# Patient Record
Sex: FEMALE | Race: WHITE
[De-identification: names, ages, dates, MRNs, and addresses within clinical notes are randomized per-mention and may not be internally consistent; named-entity substitution may affect disease eponyms.]

---
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
## 2023-09-14 ENCOUNTER — OFFICE VISIT (OUTPATIENT)
Dept: INTERNAL MEDICINE | Facility: CLINIC | Age: 18
End: 2023-09-14
Payer: COMMERCIAL

## 2023-09-14 ENCOUNTER — PATIENT MESSAGE (OUTPATIENT)
Dept: INTERNAL MEDICINE | Facility: CLINIC | Age: 18
End: 2023-09-14

## 2023-09-14 VITALS
HEIGHT: 61 IN | SYSTOLIC BLOOD PRESSURE: 110 MMHG | DIASTOLIC BLOOD PRESSURE: 84 MMHG | HEART RATE: 88 BPM | WEIGHT: 223 LBS | BODY MASS INDEX: 42.1 KG/M2 | RESPIRATION RATE: 12 BRPM | TEMPERATURE: 97.8 F

## 2023-09-14 DIAGNOSIS — E55.9 VITAMIN D DEFICIENCY: ICD-10-CM

## 2023-09-14 DIAGNOSIS — E66.01 MORBID (SEVERE) OBESITY DUE TO EXCESS CALORIES: ICD-10-CM

## 2023-09-14 DIAGNOSIS — R00.2 HEART PALPITATIONS: ICD-10-CM

## 2023-09-14 DIAGNOSIS — R07.89 OTHER CHEST PAIN: Primary | ICD-10-CM

## 2023-09-14 DIAGNOSIS — F33.1 MODERATE EPISODE OF RECURRENT MAJOR DEPRESSIVE DISORDER: ICD-10-CM

## 2023-09-14 DIAGNOSIS — F41.1 GENERALIZED ANXIETY DISORDER: ICD-10-CM

## 2023-09-14 DIAGNOSIS — J98.59 MASS OF MEDIASTINUM: ICD-10-CM

## 2023-09-14 DIAGNOSIS — F17.290 OTHER TOBACCO PRODUCT NICOTINE DEPENDENCE, UNCOMPLICATED: ICD-10-CM

## 2023-09-14 LAB
25(OH)D3 SERPL-MCNC: 25.1 NG/ML (ref 30–100)
ALBUMIN SERPL-MCNC: 4.3 G/DL (ref 3.5–5.2)
ALBUMIN/GLOB SERPL: 1.4 G/DL
ALP SERPL-CCNC: 110 U/L (ref 39–117)
ALT SERPL W P-5'-P-CCNC: 33 U/L (ref 1–33)
ANION GAP SERPL CALCULATED.3IONS-SCNC: 10 MMOL/L (ref 5–15)
AST SERPL-CCNC: 24 U/L (ref 1–32)
BASOPHILS # BLD AUTO: 0.05 10*3/MM3 (ref 0–0.2)
BASOPHILS NFR BLD AUTO: 0.7 % (ref 0–1.5)
BILIRUB SERPL-MCNC: 0.2 MG/DL (ref 0–1.2)
BUN SERPL-MCNC: 13 MG/DL (ref 6–20)
BUN/CREAT SERPL: 18.1 (ref 7–25)
CALCIUM SPEC-SCNC: 9.8 MG/DL (ref 8.6–10.5)
CHLORIDE SERPL-SCNC: 105 MMOL/L (ref 98–107)
CHOLEST SERPL-MCNC: 222 MG/DL (ref 0–200)
CO2 SERPL-SCNC: 25 MMOL/L (ref 22–29)
CREAT SERPL-MCNC: 0.72 MG/DL (ref 0.57–1)
DEPRECATED RDW RBC AUTO: 41 FL (ref 37–54)
EGFRCR SERPLBLD CKD-EPI 2021: 122.9 ML/MIN/1.73
EOSINOPHIL # BLD AUTO: 0.86 10*3/MM3 (ref 0–0.4)
EOSINOPHIL NFR BLD AUTO: 11.4 % (ref 0.3–6.2)
ERYTHROCYTE [DISTWIDTH] IN BLOOD BY AUTOMATED COUNT: 14.1 % (ref 12.3–15.4)
GLOBULIN UR ELPH-MCNC: 3.1 GM/DL
GLUCOSE SERPL-MCNC: 85 MG/DL (ref 65–99)
HCT VFR BLD AUTO: 37.8 % (ref 34–46.6)
HDLC SERPL-MCNC: 47 MG/DL (ref 40–60)
HGB BLD-MCNC: 12.5 G/DL (ref 12–15.9)
IMM GRANULOCYTES # BLD AUTO: 0.03 10*3/MM3 (ref 0–0.05)
IMM GRANULOCYTES NFR BLD AUTO: 0.4 % (ref 0–0.5)
LDLC SERPL CALC-MCNC: 161 MG/DL (ref 0–100)
LDLC/HDLC SERPL: 3.39 {RATIO}
LYMPHOCYTES # BLD AUTO: 1.92 10*3/MM3 (ref 0.7–3.1)
LYMPHOCYTES NFR BLD AUTO: 25.5 % (ref 19.6–45.3)
MCH RBC QN AUTO: 26.6 PG (ref 26.6–33)
MCHC RBC AUTO-ENTMCNC: 33.1 G/DL (ref 31.5–35.7)
MCV RBC AUTO: 80.4 FL (ref 79–97)
MONOCYTES # BLD AUTO: 0.36 10*3/MM3 (ref 0.1–0.9)
MONOCYTES NFR BLD AUTO: 4.8 % (ref 5–12)
NEUTROPHILS NFR BLD AUTO: 4.31 10*3/MM3 (ref 1.7–7)
NEUTROPHILS NFR BLD AUTO: 57.2 % (ref 42.7–76)
NRBC BLD AUTO-RTO: 0 /100 WBC (ref 0–0.2)
PLATELET # BLD AUTO: 285 10*3/MM3 (ref 140–450)
PMV BLD AUTO: 10.2 FL (ref 6–12)
POTASSIUM SERPL-SCNC: 4.4 MMOL/L (ref 3.5–5.2)
PROT SERPL-MCNC: 7.4 G/DL (ref 6–8.5)
RBC # BLD AUTO: 4.7 10*6/MM3 (ref 3.77–5.28)
SODIUM SERPL-SCNC: 140 MMOL/L (ref 136–145)
T4 FREE SERPL-MCNC: 1.05 NG/DL (ref 0.93–1.7)
TRIGL SERPL-MCNC: 79 MG/DL (ref 0–150)
TSH SERPL DL<=0.05 MIU/L-ACNC: 1.25 UIU/ML (ref 0.27–4.2)
VLDLC SERPL-MCNC: 14 MG/DL (ref 5–40)
WBC NRBC COR # BLD: 7.53 10*3/MM3 (ref 3.4–10.8)

## 2023-09-14 PROCEDURE — 80050 GENERAL HEALTH PANEL: CPT | Performed by: PHYSICIAN ASSISTANT

## 2023-09-14 PROCEDURE — 80061 LIPID PANEL: CPT | Performed by: PHYSICIAN ASSISTANT

## 2023-09-14 PROCEDURE — 84439 ASSAY OF FREE THYROXINE: CPT | Performed by: PHYSICIAN ASSISTANT

## 2023-09-14 PROCEDURE — 82306 VITAMIN D 25 HYDROXY: CPT | Performed by: PHYSICIAN ASSISTANT

## 2023-09-14 NOTE — PROGRESS NOTES
"    Office Note     Name: Jose Francisco Mohan    : 2005     MRN: [id number]     Chief Complaint  New Patient , Mass (Left lung ), Chest Pain, Rapid Heart Rate, Obesity, Nicotine Dependence, Depression, and Anxiety    Subjective     History of Present Illness:  Jose Francisco Mohan is a 18 y.o. female who presents today to [facility] care. She has agreed to utilize [facility].     The patient had an CTAperformed in 2022 at  [facility], where a mass was present. She was never informed of mass noted on CT just noticed the report last weeks and would like further evaluation. Patient does report about 6 months ago Chest xray was performed and On chest x-ray, no mass was seen.The patient has not had a follow up appointment. The patient reports left sided, upper, chest pain. She states the pain is random. She describes the pain as stabbing, and states it lasts for no more than 10 seconds per time, up to 3 times per day. She denies the pain worsening upon exertion. She is unsure of the pain is food related. She has been experiencing pain for 1 year. She denies having cardiac testing performed.     The patient states she had recent labs performed. She inquires these results.     The patient inquires tachycardia, which she experienced while in the ER.     The patient inquires smoking cessation.    The patient reports anxiety and depression. She has previously taken Wellbutrin, which caused headache. She has previously taken Zoloft, which she states \"made her feel like a zombie\".    The patient inquires weight loss medication.     Review of Systems:   Review of Systems   Constitutional:  Positive for unexpected weight gain.   Cardiovascular:  Positive for chest pain.   Psychiatric/Behavioral:  Positive for depressed mood. The patient is nervous/anxious.      Past Medical History:   Past Medical History:   Diagnosis Date    Anxiety     Depression     Obesity        Past Surgical History:   Past Surgical History:   Procedure Laterality " "Date    EAR TUBES      LABIAL ADHESION LYSIS         Immunizations:   Immunization History   Administered Date(s) Administered    HPV Bivalent 08/21/2014    HPV Quadrivalent 08/21/2014    Meningococcal Conjugate 08/21/2014    Meningococcal Polysaccharide 08/21/2014    Tdap 08/21/2014        Medications:     Current Outpatient Medications:     vitamin D (ERGOCALCIFEROL) 1.25 MG (07183 UT) capsule capsule, Take 1 capsule by mouth 1 (One) Time Per Week., Disp: 8 capsule, Rfl: 0    Allergies:   Allergies   Allergen Reactions    Wellbutrin [Bupropion] Headache    Bactrim [Sulfamethoxazole-Trimethoprim] Rash       Family History:   Family History   Problem Relation Age of Onset    Anxiety disorder Mother     COPD Paternal Grandfather        Social History:   Social History     Socioeconomic History    Marital status: Single   Tobacco Use    Smoking status: Every Day     Types: Electronic Cigarette    Smokeless tobacco: Never   Vaping Use    Vaping Use: Every day    Substances: Nicotine    Devices: Disposable   Substance and Sexual Activity    Alcohol use: No    Drug use: No    Sexual activity: Yes     Partners: Male     Birth control/protection: I.U.D.         Objective     Vital Signs  /84 (BP Location: Left arm, Patient Position: Sitting, Cuff Size: Adult)   Pulse 88   Temp 97.8 °F (36.6 °C) (Infrared)   Resp 12   Ht 154.9 cm (61\")   Wt 101 kg (223 lb)   BMI 42.14 kg/m²   Estimated body mass index is 42.14 kg/m² as calculated from the following:    Height as of this encounter: 154.9 cm (61\").    Weight as of this encounter: 101 kg (223 lb).          Physical Exam  Vitals and nursing note reviewed.   Constitutional:       Appearance: Normal appearance. She is obese.   Cardiovascular:      Rate and Rhythm: Normal rate and regular rhythm.      Pulses: Normal pulses.      Heart sounds: Normal heart sounds.   Pulmonary:      Effort: Pulmonary effort is normal.      Breath sounds: Normal breath sounds. "   Musculoskeletal:         General: Normal range of motion.   Skin:     General: Skin is warm and dry.   Neurological:      General: No focal deficit present.      Mental Status: She is alert.   Psychiatric:         Mood and Affect: Mood normal.         Behavior: Behavior normal.        ECG 12 Lead    Date/Time: 9/19/2023 1:57 PM  Performed by: Monica Baig PA-C  Authorized by: Monica Baig PA-C   Previous ECG: no previous ECG available  Rhythm: sinus rhythm  Conduction: conduction normal  ST Segments: ST segments normal  Other: no other findings        Assessment and Plan     Problem List Items Addressed This Visit          Cardiac and Vasculature    Heart palpitations    Relevant Orders    ECG 12 Lead    Holter Monitor - 48 Hour    Treadmill Stress Test    GeneSight - Swab, (Completed)    Lipid Panel (Completed)    TSH (Completed)    T4, Free (Completed)    Vitamin D,25-Hydroxy (Completed)    Comprehensive Metabolic Panel (Completed)    CBC & Differential (Completed)    Other chest pain - Primary    Overview     The patient had an CTA performed in 11/2022 at  [facility], where a mass was present. She was never informed of mass noted on CT just noticed the report last weeks and would like further evaluation. Patient does report about 6 months ago Chest xray was performed and On chest x-ray, no mass was seen.The patient has not had a follow up appointment. The patient reports left sided, upper, chest pain. She states the pain is random. She describes the pain as stabbing, and states it lasts for no more than 10 seconds per time, up to 3 times per day. She denies the pain worsening upon exertion. She is unsure of the pain is food related. She has been experiencing pain for 1 year. She denies having cardiac testing performed.     Holter ECHO, stress test as well as CT chest ordered    Appointment with cardiology 9/28/23         Relevant Orders    ECG 12 Lead    Holter Monitor - 48 Hour    Treadmill Stress  Test    GeneSight - Swab, (Completed)    Lipid Panel (Completed)    TSH (Completed)    T4, Free (Completed)    Vitamin D,25-Hydroxy (Completed)    Comprehensive Metabolic Panel (Completed)    CBC & Differential (Completed)    CT Chest Without Contrast       Endocrine and Metabolic    Morbid (severe) obesity due to excess calories    Relevant Orders    GeneSight - Swab, (Completed)    Lipid Panel (Completed)    TSH (Completed)    T4, Free (Completed)    Vitamin D,25-Hydroxy (Completed)    Comprehensive Metabolic Panel (Completed)    CBC & Differential (Completed)    Vitamin D deficiency    Relevant Medications    vitamin D (ERGOCALCIFEROL) 1.25 MG (20383 UT) capsule capsule       Hematology and Neoplasia    Mass of mediastinum    Overview     The patient had an CTA performed in 11/2022 at  [facility], where a mass was present. She was never informed of mass noted on CT just noticed the report last weeks and would like further evaluation. Patient does report about 6 months ago Chest xray was performed and On chest x-ray, no mass was seen.The patient has not had a follow up appointment. The patient reports left sided, upper, chest pain. She states the pain is random. She describes the pain as stabbing, and states it lasts for no more than 10 seconds per time, up to 3 times per day. She denies the pain worsening upon exertion. She is unsure of the pain is food related. She has been experiencing pain for 1 year. She denies having cardiac testing performed.     CT with and without contrast ordered         Relevant Orders    CT Chest Without Contrast       Mental Health    Generalized anxiety disorder    Relevant Orders    GeneSight - Swab, (Completed)    Lipid Panel (Completed)    TSH (Completed)    T4, Free (Completed)    Vitamin D,25-Hydroxy (Completed)    Comprehensive Metabolic Panel (Completed)    CBC & Differential (Completed)    Moderate episode of recurrent major depressive disorder    Relevant Orders    GeneSight -  Swab, (Completed)    Lipid Panel (Completed)    TSH (Completed)    T4, Free (Completed)    Vitamin D,25-Hydroxy (Completed)    Comprehensive Metabolic Panel (Completed)    CBC & Differential (Completed)       Tobacco    Nicotine dependence          - I will place an order for CT, Holter monitor, and stress test. Patient counseled on smoking cessation. I will place an order for GeneSight. I will place an order for labs. I will prescribe Topamax. Patient advised to keep a food log. Patient will have EKG performed.     Smoking cessation quit date in approximately 3 months she is hoping that getting better control of anxiety and depression will help her stop.    Transcribed from ambient dictation for Monica Baig PA-C by Rhonda Andrade.  09/14/23   12:12 EDT    Patient or patient representative verbalized consent to the visit recording.  I have personally performed the services described in this document as transcribed by the above individual, and it is both accurate and complete.      Patient verbalized good understanding of diagnosis and treatment plan.  All questions answered to their satisfaction.      Red flag symptoms and indications to report to ER discussed with patient who verbalized good understanding.     Patient counseled on the side effects of prescribed medication and verbalized good understanding.  Recommended taking probiotics while taking antibiotics.  Patient is to call office for any new or worsening symptoms.  Patient verbalized understanding of indications to report to the ER.      Follow Up  Return in about 3 weeks (around 10/5/2023) for Recheck chest pain, anxiety, depression, labs, smoking cessation.    GENESIS PorrasE [facility]  [address] [address] [phone]

## 2023-09-14 NOTE — PATIENT INSTRUCTIONS
MyPlate from USDA    MyPlate is an outline of a general healthy diet based on the Dietary Guidelines for Americans, 8724-5512, from the U.S. Department of Agriculture (USDA). It sets guidelines for how much food you should eat from each food group based on your age, sex, and level of physical activity.  What are tips for following MyPlate?  To follow MyPlate recommendations:  Eat a wide variety of fruits and vegetables, grains, and protein foods.  Serve smaller portions and eat less food throughout the day.  Limit portion sizes to avoid overeating.  Enjoy your food.  Get at least 150 minutes of exercise every week. This is about 30 minutes each day, 5 or more days per week.  It can be difficult to have every meal look like MyPlate. Think about MyPlate as eating guidelines for an entire day, rather than each individual meal.  Fruits and vegetables  Make one half of your plate fruits and vegetables.  Eat many different colors of fruits and vegetables each day.  For a 2,000-calorie daily food plan, eat:  2½ cups of vegetables every day.  2 cups of fruit every day.  1 cup is equal to:  1 cup raw or cooked vegetables.  1 cup raw fruit.  1 medium-sized orange, apple, or banana.  1 cup 100% fruit or vegetable juice.  2 cups raw leafy greens, such as lettuce, spinach, or kale.  ½ cup dried fruit.  Grains  One fourth of your plate should be grains.  Make at least half of the grains you eat each day whole grains.  For a 2,000-calorie daily food plan, eat 6 oz of grains every day.  1 oz is equal to:  1 slice bread.  1 cup cereal.  ½ cup cooked rice, cereal, or pasta.  Protein  One fourth of your plate should be protein.  Eat a wide variety of protein foods, including meat, poultry, fish, eggs, beans, nuts, and tofu.  For a 2,000-calorie daily food plan, eat 5½ oz of protein every day.  1 oz is equal to:  1 oz meat, poultry, or fish.  ¼ cup cooked beans.  1 egg.  ½ oz nuts or seeds.  1 Tbsp peanut butter.  Dairy  Drink fat-free  or low-fat (1%) milk.  Eat or drink dairy as a side to meals.  For a 2,000-calorie daily food plan, eat or drink 3 cups of dairy every day.  1 cup is equal to:  1 cup milk, yogurt, cottage cheese, or soy milk (soy beverage).  2 oz processed cheese.  1½ oz natural cheese.  Fats, oils, salt, and sugars  Only small amounts of oils are recommended.  Avoid foods that are high in calories and low in nutritional value (empty calories), like foods high in fat or added sugars.  Choose foods that are low in salt (sodium). Choose foods that have less than 140 milligrams (mg) of sodium per serving.  Drink water instead of sugary drinks. Drink enough fluid to keep your urine pale yellow.  Where to find support  Work with your health care provider or a dietitian to develop a customized eating plan that is right for you.  Download an carlos (mobile application) to help you track your daily food intake.  Where to find more information  USDA: ChooseMyPlate.gov  Summary  MyPlate is a general guideline for healthy eating from the USDA. It is based on the Dietary Guidelines for Americans, 0601-8068.  In general, fruits and vegetables should take up one half of your plate, grains should take up one fourth of your plate, and protein should take up one fourth of your plate.  This information is not intended to replace advice given to you by your health care provider. Make sure you discuss any questions you have with your health care provider.  Document Revised: 11/08/2021 Document Reviewed: 11/08/2021  [employer] Patient Education © 2023 Elsevier Inc.

## 2023-09-15 RX ORDER — ERGOCALCIFEROL 1.25 MG/1
50000 CAPSULE ORAL WEEKLY
Qty: 8 CAPSULE | Refills: 0 | Status: SHIPPED | OUTPATIENT
Start: 2023-09-15

## 2023-09-15 NOTE — TELEPHONE ENCOUNTER
From: Jose Francisco Mohan  To: Monica Baig  Sent: 9/14/2023 9:07 PM EDT  Subject: Question regarding LIPID PANEL    Hey just curious what my results mean! Thanks

## 2023-09-19 PROBLEM — J98.59 MASS OF MEDIASTINUM: Status: ACTIVE | Noted: 2023-09-19

## 2023-09-19 PROBLEM — E55.9 VITAMIN D DEFICIENCY: Status: ACTIVE | Noted: 2023-09-19

## 2023-09-19 PROBLEM — F17.200 NICOTINE DEPENDENCE: Status: ACTIVE | Noted: 2023-09-19

## 2023-09-20 ENCOUNTER — TELEPHONE (OUTPATIENT)
Dept: INTERNAL MEDICINE | Facility: CLINIC | Age: 18
End: 2023-09-20
Payer: COMMERCIAL

## 2023-09-20 NOTE — TELEPHONE ENCOUNTER
----- Message from Monica Baig PA-C sent at 9/20/2023  7:56 AM EDT -----  Please inform patient gene sight testing has returned and encourage to make follow up to discuss

## 2023-09-20 NOTE — TELEPHONE ENCOUNTER
Caller: Jose Francisco Mohan    Relationship: Self    Best call back number:      What is the best time to reach you: ANYTIME    Who are you requesting to speak with (clinical staff, provider,  specific staff member): CLINICAL STAFF    Do you know the name of the person who called: JOSE FRANCISCO    What was the call regarding: PATIENT WAS READ ENCOUNTER, WILL KEEP APPT [id number]

## 2023-09-20 NOTE — TELEPHONE ENCOUNTER
I have called and left a message with return phone number    HUB OK TO READ:  Please inform patient gene sight testing has returned and encourage to make follow up to discuss

## 2023-09-26 ENCOUNTER — HOSPITAL ENCOUNTER (OUTPATIENT)
Dept: CARDIOLOGY | Facility: HOSPITAL | Age: 18
Discharge: HOME | End: 2023-09-26
Payer: COMMERCIAL

## 2023-09-26 ENCOUNTER — OFFICE VISIT (OUTPATIENT)
Dept: CARDIOLOGY | Facility: HOSPITAL | Age: 18
End: 2023-09-26
Payer: COMMERCIAL

## 2023-09-26 VITALS
TEMPERATURE: 97.7 F | HEIGHT: 61 IN | SYSTOLIC BLOOD PRESSURE: 97 MMHG | DIASTOLIC BLOOD PRESSURE: 57 MMHG | OXYGEN SATURATION: 97 % | WEIGHT: 219.6 LBS | HEART RATE: 102 BPM | BODY MASS INDEX: 41.46 KG/M2 | RESPIRATION RATE: 18 BRPM

## 2023-09-26 DIAGNOSIS — R00.2 HEART PALPITATIONS: ICD-10-CM

## 2023-09-26 DIAGNOSIS — R07.89 OTHER CHEST PAIN: ICD-10-CM

## 2023-09-26 DIAGNOSIS — E78.00 ELEVATED LDL CHOLESTEROL LEVEL: Primary | ICD-10-CM

## 2023-09-26 PROCEDURE — 93242 EXT ECG>48HR<7D RECORDING: CPT

## 2023-09-26 NOTE — PROGRESS NOTES
[facility] Heart Monitor Documentation    Jose Francisco Mohan  2005  [id number]  09/26/23      [] ZIO XT Patch  Model [id number] Prescribed for  Days    Serial Number: (N + 9 Digits) N   Apply-By Date on Box:   USPS Tracking Number:   USPS Tracking        [] [employer] MINI PLUS Mobile Cardiac Telemetry  Model BGMINIPLUS Prescribed for  Days    Serial Number: (BGM + 7 Digits) BGM  Shipped-By Date on Box:   UPS Tracking Number: [id number]  UPS Tracking      [x] [employer] MINI Holter Monitor  Model BGMINIEL Prescribed for 7 Days    Serial Number: (7 Digits) [id number]  Shipped-By Date on Box: 09/20/2023  UPS Tracking Number: [id number]  UPS Tracking        This monitor was applied to the patient's chest and checked for proper functioning.  Ms. Jose Francisco Mohan was instructed in the proper use of this monitor.  She was given the opportunity to ask questions and left the office with the device 's instruction manual.    Sis Malcolm, [facility], 14:40 EDT, 09/26/23                  [facility]

## 2023-09-26 NOTE — PROGRESS NOTES
"Chief Complaint  Palpitations and Chest Pain    Subjective      History of Present Illness {CC  Problem List  Visit  Diagnosis   Encounters  Notes  Medications  Labs  Result Review Imaging  Media :23}     Jose Francisco Mohan, 18 y.o. female with no prior cardiac history presents to [facility] for Palpitations and Chest Pain    Patient presents upon referral from PCP SVITLANA Porras for evaluation of chest pain. PCP note indicates CTA performed in 11/2022 at  [facility], where a mass was present. She was never informed of mass noted on CT just noticed the report last weeks and would like further evaluation. PCP ordered chest CT, treadmill stress test, and holter monitor.     Present today noting racing heartbeat for the past year, no syncope. Chest pain for the pasty 1.5 years; describes as a dull left chest pressure intermittent at rest and with activity. No shortness of breath or syncope. Scheduled for repeat CT in 2 days for an incidental left chest mass approximately one year ago. Father with unknown cardiac issues, grandma with pulmonary hypertension.  3-4 sodas per day, no alcohol or drug use. 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      Objective     Vital Signs:   Vitals:    09/26/23 1413 09/26/23 1414 09/26/23 1415   BP: 97/50 102/68 97/57   BP Location: Right arm Left arm Left arm   Patient Position: Sitting Standing Sitting   Cuff Size: Adult Adult Adult   Pulse: 101 114 102   Resp:   18   Temp: 97.7 °F (36.5 °C) 97.7 °F (36.5 °C) 97.7 °F (36.5 °C)   TempSrc: Temporal Temporal Temporal   SpO2: 97% 97% 97%   Weight:   99.6 kg (219 lb 9.6 oz)   Height:   154.9 cm (61\")     Body mass index is 41.49 kg/m².  Physical Exam  Vitals and nursing note reviewed.   Constitutional:       Appearance: Normal appearance.   HENT:      Head: Normocephalic.   Eyes:      Extraocular Movements: Extraocular movements intact.   Neck:      Vascular: No carotid bruit. "   Cardiovascular:      Rate and Rhythm: Normal rate and regular rhythm.      Pulses: Normal pulses.      Heart sounds: Normal heart sounds, S1 normal and S2 normal. No murmur heard.  Pulmonary:      Effort: Pulmonary effort is normal. No respiratory distress.      Breath sounds: Normal breath sounds.   Musculoskeletal:      Cervical back: Neck supple.      Right lower leg: No edema.      Left lower leg: No edema.   Skin:     General: Skin is warm and dry.   Neurological:      General: No focal deficit present.      Mental Status: She is alert.   Psychiatric:         Mood and Affect: Mood normal.         Behavior: Behavior normal.         Thought Content: Thought content normal.      Data Reviewed:{ Labs  Result Review  Imaging  Med Tab  Media :23}     ECG 12 Lead (09/19/2023 13:57)   XR Chest 1 View (09/04/2023 01:58)   CBC & Differential (09/14/2023 10:20)  Comprehensive Metabolic Panel (09/14/2023 10:20)  T4, Free (09/14/2023 10:20)  TSH (09/14/2023 10:20)  Lipid Panel (09/14/2023 10:20)    Assessment & Plan   Assessment and Plan {CC Problem List  Visit Diagnosis  ROS  Review (Popup)  Health Maintenance  Quality  BestPractice  Medications  SmartSets  SnapShot Encounters  Media :23}     1. Chest pain  -Intermittent dull left chest pressure that occurs at rest and with activity  -Recent ECG normal sinus rhythm, no evidence of ischemia.  Likely low risk for CAD given her young age and symptoms not consistent with anginal pain.  -TTE for structural/functional evaluation  - Holter Monitor - 14 Days; Future  -Continue with follow-up chest CT in approximately 2 days for incidental finding of left chest mass identified approximately 1 year ago on chest CT    2. Heart palpitations  -Intermittent rapid heartbeat  - Holter Monitor - 7 days  - Adult Transthoracic Echo Complete W/ Cont if Necessary Per Protocol; Future  -Follow-up in approximately 1 month for monitor results, symptom check    3. Elevated LDL  cholesterol level  -Recent lipid panel with   -Discussed adherence to low-fat diet  -Routine lipid monitoring through PCP      Follow Up {Instructions Charge Capture  Follow-up Communications :23}     Return in about 1 month (around 10/26/2023) for Video visit, Recheck, Monitor results.        Patient was given instructions and counseling regarding her condition or for health maintenance advice. Please see specific information pulled into the AVS if appropriate.  Patient was instructed to call the [facility] with any questions, concerns, or worsening symptoms.    Dictated Utilizing Dragon Dictation   Please note that portions of this note were completed with a voice recognition program.   Part of this note may be an electronic transcription/translation of spoken language to printed text using the Dragon Dictation System.

## 2023-09-26 NOTE — PATIENT INSTRUCTIONS
- Heart monitor for 7 days    - Office will call to schedule testing  - Office will call or send message with testing results

## 2023-09-28 ENCOUNTER — HOSPITAL ENCOUNTER (OUTPATIENT)
Dept: CT IMAGING | Facility: HOSPITAL | Age: 18
Discharge: HOME | End: 2023-09-28
Admitting: PHYSICIAN ASSISTANT
Payer: COMMERCIAL

## 2023-09-28 DIAGNOSIS — R07.89 OTHER CHEST PAIN: ICD-10-CM

## 2023-09-28 DIAGNOSIS — J98.59 MASS OF MEDIASTINUM: ICD-10-CM

## 2023-09-28 PROCEDURE — 71250 CT THORAX DX C-: CPT

## 2023-10-02 ENCOUNTER — TELEPHONE (OUTPATIENT)
Dept: INTERNAL MEDICINE | Facility: CLINIC | Age: 18
End: 2023-10-02

## 2023-10-02 DIAGNOSIS — J98.59 MEDIASTINAL MASS: Primary | ICD-10-CM

## 2023-10-02 NOTE — TELEPHONE ENCOUNTER
Caller: Jose Francisco Mohan    Relationship: Self    Best call back number: [phone]     What was the call regarding: PATIENT NEEDS A CALL BACK TO DISCUSS GETTING A LEAVE OF ABSENCE DUE TO HAVING SO MANY UPCOMMING DRS APPOINTMENTS. PATIENT NEEDS TO SPEAK TO PCP OR NURSE AS SOON AS POSSIBLE.     Is it okay if the provider responds through MyChart: YES

## 2023-10-02 NOTE — TELEPHONE ENCOUNTER
She states that she needs a start and end date to take a thirty day leave from school as if she misses greater than 3 days of class, she will be expelled from school.

## 2023-10-05 ENCOUNTER — OFFICE VISIT (OUTPATIENT)
Dept: INTERNAL MEDICINE | Facility: CLINIC | Age: 18
End: 2023-10-05
Payer: COMMERCIAL

## 2023-10-05 VITALS
RESPIRATION RATE: 17 BRPM | SYSTOLIC BLOOD PRESSURE: 104 MMHG | TEMPERATURE: 97.4 F | HEART RATE: 98 BPM | BODY MASS INDEX: 41.76 KG/M2 | WEIGHT: 221 LBS | DIASTOLIC BLOOD PRESSURE: 66 MMHG

## 2023-10-05 DIAGNOSIS — F33.1 MODERATE EPISODE OF RECURRENT MAJOR DEPRESSIVE DISORDER: ICD-10-CM

## 2023-10-05 DIAGNOSIS — F41.1 GENERALIZED ANXIETY DISORDER: Primary | ICD-10-CM

## 2023-10-05 RX ORDER — HYDROXYZINE PAMOATE 25 MG/1
25 CAPSULE ORAL 3 TIMES DAILY PRN
Qty: 30 CAPSULE | Refills: 0 | Status: SHIPPED | OUTPATIENT
Start: 2023-10-05

## 2023-10-05 RX ORDER — BUPROPION HYDROCHLORIDE 150 MG/1
150 TABLET ORAL DAILY
Qty: 30 TABLET | Refills: 1 | Status: SHIPPED | OUTPATIENT
Start: 2023-10-05

## 2023-10-05 NOTE — PROGRESS NOTES
Office Note     Name: Jose Francisco Mohan    : 2005     MRN: [id number]     Chief Complaint  Discuss genetics testing    Subjective     History of Present Illness:  Jose Francisco Mohan is a 18 y.o. female who presents today to follow-up on [facility] testing.     The patient reports having little interest in doing things nearly every day, feeling down and depressed more than half the days, has trouble falling asleep more than half the days, feels fatigued daily, poor appetite daily, feeling bad about herself several days, has difficulty concentrating several days, and is fidgety more than half the days. She denies any thoughts of harming herself or feeling better off dead. She has difficulty getting out of bed. It is extremely difficult for her to work, complete daily tasks, and get along with others. The patient is on attendance probation at school for missing too many days due to her issues. She feels anxious and depressed. She experiences panic attacks when her anxiety is high, and they occur mostly at night when trying to fall asleep. Patient tried hydroxyzine in the past that was effective for her anxiety. She tried Wellbutrin in the past but did not take it long enough to be effective. She is unsure if Wellbutrin caused headaches because she was already experiencing them and dealing with postpartum depression at that time. Patient also tried Zoloft.     The patient has an upcoming appointment with the cardiothoracic surgeon scheduled on 10/24/2023. She complains of frequent chest pain. She was supposed to wear the Holter monitor for 1 week, but she removed it 2 days early because it was painful.     She declines the influenza vaccine today.       Review of Systems:   Review of Systems    Past Medical History:   Past Medical History:   Diagnosis Date    Anxiety     Depression     Obesity        Past Surgical History:   Past Surgical History:   Procedure Laterality Date    EAR TUBES       "LABIAL ADHESION LYSIS         Immunizations:   Immunization History   Administered Date(s) Administered    HPV Bivalent 08/21/2014    HPV Quadrivalent 08/21/2014    Meningococcal Conjugate 08/21/2014    Meningococcal Polysaccharide 08/21/2014    Tdap 08/21/2014        Medications:     Current Outpatient Medications:     buPROPion XL (Wellbutrin XL) 150 MG 24 hr tablet, Take 1 tablet by mouth Daily., Disp: 30 tablet, Rfl: 1    hydrOXYzine pamoate (VISTARIL) 25 MG capsule, Take 1 capsule by mouth 3 (Three) Times a Day As Needed for Itching., Disp: 30 capsule, Rfl: 0    vitamin D (ERGOCALCIFEROL) 1.25 MG (78899 UT) capsule capsule, Take 1 capsule by mouth 1 (One) Time Per Week., Disp: 8 capsule, Rfl: 0    Allergies:   Allergies   Allergen Reactions    Wellbutrin [Bupropion] Headache    Bactrim [Sulfamethoxazole-Trimethoprim] Rash       Family History:   Family History   Problem Relation Age of Onset    Anxiety disorder Mother     Heart disease Father     No Known Problems Maternal Grandmother     No Known Problems Maternal Grandfather     Other Paternal Grandmother         pulmonary HTN    COPD Paternal Grandfather        Social History:   Social History     Socioeconomic History    Marital status: Single   Tobacco Use    Smoking status: Every Day     Types: Electronic Cigarette    Smokeless tobacco: Never   Vaping Use    Vaping Use: Every day    Substances: Nicotine    Devices: Disposable   Substance and Sexual Activity    Alcohol use: No    Drug use: No    Sexual activity: Yes     Partners: Male     Birth control/protection: I.U.D.         Objective     Vital Signs  /66   Pulse 98   Temp 97.4 °F (36.3 °C)   Resp 17   Wt 100 kg (221 lb)   BMI 41.76 kg/m²   Estimated body mass index is 41.76 kg/m² as calculated from the following:    Height as of 9/26/23: 154.9 cm (61\").    Weight as of this encounter: 100 kg (221 lb).            Physical Exam  Vitals and nursing note reviewed.   Constitutional:       " Appearance: Normal appearance.   Cardiovascular:      Rate and Rhythm: Normal rate and regular rhythm.   Pulmonary:      Effort: Pulmonary effort is normal.      Breath sounds: Normal breath sounds.   Skin:     General: Skin is warm and dry.   Neurological:      General: No focal deficit present.      Mental Status: She is alert.   Psychiatric:         Mood and Affect: Mood normal.         Behavior: Behavior normal.        Assessment and Plan     Problem List Items Addressed This Visit          Mental Health    Generalized anxiety disorder - Primary    Relevant Medications    buPROPion XL (Wellbutrin XL) 150 MG 24 hr tablet    hydrOXYzine pamoate (VISTARIL) 25 MG capsule    Moderate episode of recurrent major depressive disorder    Relevant Medications    buPROPion XL (Wellbutrin XL) 150 MG 24 hr tablet    hydrOXYzine pamoate (VISTARIL) 25 MG capsule       Letter for school was provided for medical leave of absence from 10/05/2023 to 10/31/2023.   Patient verbalized good understanding of diagnosis and treatment plan.  All questions answered to their satisfaction.      Red flag symptoms and indications to report to ER discussed with patient who verbalized good understanding.     Patient counseled on the side effects of prescribed medication and verbalized good understanding.  Recommended taking probiotics while taking antibiotics.  Patient is to call office for any new or worsening symptoms.  Patient verbalized understanding of indications to report to the ER.      Follow Up  Return in about 3 weeks (around 10/26/2023) for Recheck CT surgery follow up and anxiety and depression.    GENESIS Porras [facility]  [address] [id number] [phone]      Transcribed from ambient dictation for Monica Baig PA-C by Batool Lal.  10/05/23   13:59 EDT    Patient or patient representative verbalized consent to  the visit recording.  I have personally performed the services described in this document as transcribed by the above individual, and it is both accurate and complete.

## 2023-10-19 ENCOUNTER — HOSPITAL ENCOUNTER (OUTPATIENT)
Dept: CARDIOLOGY | Facility: HOSPITAL | Age: 18
Discharge: HOME | End: 2023-10-19
Admitting: NURSE PRACTITIONER
Payer: COMMERCIAL

## 2023-10-19 DIAGNOSIS — R00.2 HEART PALPITATIONS: ICD-10-CM

## 2023-10-19 DIAGNOSIS — R07.89 OTHER CHEST PAIN: ICD-10-CM

## 2023-10-19 LAB
BH CV ECHO MEAS - AO MAX PG: 6.3 MMHG
BH CV ECHO MEAS - AO MEAN PG: 3 MMHG
BH CV ECHO MEAS - AO ROOT DIAM: 2.8 CM
BH CV ECHO MEAS - AO V2 MAX: 125 CM/SEC
BH CV ECHO MEAS - AO V2 VTI: 23.6 CM
BH CV ECHO MEAS - AVA(I,D): 2.7 CM2
BH CV ECHO MEAS - EDV(CUBED): 82.3 ML
BH CV ECHO MEAS - EDV(MOD-SP2): 126 ML
BH CV ECHO MEAS - EDV(MOD-SP4): 106 ML
BH CV ECHO MEAS - EF(MOD-BP): 47.7 %
BH CV ECHO MEAS - EF(MOD-SP2): 50 %
BH CV ECHO MEAS - EF(MOD-SP4): 48.8 %
BH CV ECHO MEAS - ESV(CUBED): 24.4 ML
BH CV ECHO MEAS - ESV(MOD-SP2): 63 ML
BH CV ECHO MEAS - ESV(MOD-SP4): 54.3 ML
BH CV ECHO MEAS - FS: 33.3 %
BH CV ECHO MEAS - IVS/LVPW: 0.94 CM
BH CV ECHO MEAS - IVSD: 0.75 CM
BH CV ECHO MEAS - LA DIMENSION: 3.4 CM
BH CV ECHO MEAS - LAT PEAK E' VEL: 18.6 CM/SEC
BH CV ECHO MEAS - LV DIASTOLIC VOL/BSA (35-75): 53.8 CM2
BH CV ECHO MEAS - LV MASS(C)D: 103 GRAMS
BH CV ECHO MEAS - LV MAX PG: 3.7 MMHG
BH CV ECHO MEAS - LV MEAN PG: 2 MMHG
BH CV ECHO MEAS - LV SYSTOLIC VOL/BSA (12-30): 27.5 CM2
BH CV ECHO MEAS - LV V1 MAX: 96.4 CM/SEC
BH CV ECHO MEAS - LV V1 VTI: 18.2 CM
BH CV ECHO MEAS - LVIDD: 4.4 CM
BH CV ECHO MEAS - LVIDS: 2.9 CM
BH CV ECHO MEAS - LVOT AREA: 3.5 CM2
BH CV ECHO MEAS - LVOT DIAM: 2.1 CM
BH CV ECHO MEAS - LVPWD: 0.8 CM
BH CV ECHO MEAS - MED PEAK E' VEL: 13.8 CM/SEC
BH CV ECHO MEAS - MV A MAX VEL: 68.1 CM/SEC
BH CV ECHO MEAS - MV DEC SLOPE: 427 CM/SEC2
BH CV ECHO MEAS - MV E MAX VEL: 85.9 CM/SEC
BH CV ECHO MEAS - MV E/A: 1.26
BH CV ECHO MEAS - MV MAX PG: 3.5 MMHG
BH CV ECHO MEAS - MV MEAN PG: 1 MMHG
BH CV ECHO MEAS - MV P1/2T: 64.9 MSEC
BH CV ECHO MEAS - MV V2 VTI: 22.2 CM
BH CV ECHO MEAS - MVA(P1/2T): 3.4 CM2
BH CV ECHO MEAS - MVA(VTI): 2.8 CM2
BH CV ECHO MEAS - PA ACC TIME: 0.11 SEC
BH CV ECHO MEAS - SI(MOD-SP2): 32 ML/M2
BH CV ECHO MEAS - SI(MOD-SP4): 26.2 ML/M2
BH CV ECHO MEAS - SV(LVOT): 63 ML
BH CV ECHO MEAS - SV(MOD-SP2): 63 ML
BH CV ECHO MEAS - SV(MOD-SP4): 51.7 ML
BH CV ECHO MEAS - TAPSE (>1.6): 2.25 CM
BH CV ECHO MEASUREMENTS AVERAGE E/E' RATIO: 5.3
BH CV XLRA - RV BASE: 3.7 CM
BH CV XLRA - RV LENGTH: 6.2 CM
BH CV XLRA - RV MID: 3.2 CM
BH CV XLRA - TDI S': 12.9 CM/SEC
LEFT ATRIUM VOLUME INDEX: 15.5 ML/M2

## 2023-10-19 PROCEDURE — 93306 TTE W/DOPPLER COMPLETE: CPT

## 2023-10-24 ENCOUNTER — OFFICE VISIT (OUTPATIENT)
Dept: CARDIAC SURGERY | Facility: CLINIC | Age: 18
End: 2023-10-24
Payer: COMMERCIAL

## 2023-10-24 VITALS
BODY MASS INDEX: 42.25 KG/M2 | OXYGEN SATURATION: 100 % | TEMPERATURE: 97.8 F | SYSTOLIC BLOOD PRESSURE: 102 MMHG | HEART RATE: 99 BPM | WEIGHT: 223.6 LBS | DIASTOLIC BLOOD PRESSURE: 60 MMHG

## 2023-10-24 DIAGNOSIS — J98.59 MASS OF MEDIASTINUM: Primary | ICD-10-CM

## 2023-10-24 PROCEDURE — 99203 OFFICE O/P NEW LOW 30 MIN: CPT | Performed by: THORACIC SURGERY (CARDIOTHORACIC VASCULAR SURGERY)

## 2023-10-24 PROCEDURE — 1159F MED LIST DOCD IN RCRD: CPT | Performed by: THORACIC SURGERY (CARDIOTHORACIC VASCULAR SURGERY)

## 2023-10-24 PROCEDURE — 1160F RVW MEDS BY RX/DR IN RCRD: CPT | Performed by: THORACIC SURGERY (CARDIOTHORACIC VASCULAR SURGERY)

## 2023-10-24 NOTE — PROGRESS NOTES
"10/24/2023  Patient Information  Jose Francisco Mohan                                                                                          [address] [place] [address]   2005  'PCP/Referring Physician'  Monica Baig PA-C  [phone]  Monica Baig, P*  [phone]  Chief Complaint   Patient presents with    Mediastinal Mass     Referred by Monica Baig PA-C for mediastinal mass. Patient has left sided chest pain        History of Present Illness: 18-year-old  female with a history of anxiety and active nicotine abuse who presents with an \"anterior mediastinal mass.\"  Over the last month, the patient notes left-sided chest pain.  This pain is present both at rest and with exertion lasting a few seconds to minutes with no alleviating or aggravating factors.  This pain is sometimes sharp and sometimes pressure-like.  The patient does have some postprandial pain.      Patient Active Problem List   Diagnosis    Morbid (severe) obesity due to excess calories    Generalized anxiety disorder    Moderate episode of recurrent major depressive disorder    Heart palpitations    Other chest pain    Nicotine dependence    Vitamin D deficiency    Mass of mediastinum     Past Medical History:   Diagnosis Date    Anxiety     Depression     Obesity     UTI (urinary tract infection)      Past Surgical History:   Procedure Laterality Date    EAR TUBES      LABIAL ADHESION LYSIS      WISDOM TOOTH EXTRACTION         Current Outpatient Medications:     hydrOXYzine pamoate (VISTARIL) 25 MG capsule, Take 1 capsule by mouth 3 (Three) Times a Day As Needed for Itching., Disp: 30 capsule, Rfl: 0    vitamin D (ERGOCALCIFEROL) 1.25 MG (58627 UT) capsule capsule, Take 1 capsule by mouth 1 (One) Time Per Week., Disp: 8 capsule, Rfl: 0    buPROPion XL (Wellbutrin XL) 150 MG 24 hr tablet, Take 1 tablet by mouth Daily. (Patient not taking: Reported on 10/24/2023), Disp: 30 tablet, Rfl: " 1  Allergies   Allergen Reactions    Bactrim [Sulfamethoxazole-Trimethoprim] Rash     Social History     Socioeconomic History    Marital status: Single    Number of children: 2   Tobacco Use    Smoking status: Every Day     Types: Electronic Cigarette    Smokeless tobacco: Never   Vaping Use    Vaping Use: Every day    Substances: Nicotine    Devices: Disposable   Substance and Sexual Activity    Alcohol use: No    Drug use: No    Sexual activity: Yes     Partners: Male     Birth control/protection: I.U.D.     Family History   Problem Relation Age of Onset    Anxiety disorder Mother     No Known Problems Maternal Grandmother     No Known Problems Maternal Grandfather     Other Paternal Grandmother         pulmonary HTN    COPD Paternal Grandfather      Review of Systems   Constitutional: Negative for chills, decreased appetite, diaphoresis, fever, malaise/fatigue, night sweats, weight gain and weight loss.   HENT:  Negative for hoarse voice.    Eyes:  Negative for blurred vision, double vision and visual disturbance.   Cardiovascular:  Positive for chest pain. Negative for claudication, dyspnea on exertion, irregular heartbeat, leg swelling, near-syncope, orthopnea, palpitations, paroxysmal nocturnal dyspnea and syncope.   Respiratory:  Negative for cough, hemoptysis, shortness of breath, sputum production and wheezing.    Hematologic/Lymphatic: Negative for adenopathy and bleeding problem. Does not bruise/bleed easily.   Skin:  Negative for color change, nail changes, poor wound healing and rash.   Musculoskeletal:  Positive for back pain. Negative for falls and muscle cramps.   Gastrointestinal:  Negative for abdominal pain, dysphagia and heartburn.   Genitourinary:  Negative for flank pain.   Neurological:  Negative for brief paralysis, disturbances in coordination, dizziness, focal weakness, headaches, light-headedness, loss of balance, numbness, paresthesias, sensory change, vertigo and weakness.    Psychiatric/Behavioral:  Positive for depression. Negative for suicidal ideas. The patient is nervous/anxious.    Allergic/Immunologic: Negative for persistent infections.     Vitals:    10/24/23 0840   BP: 102/60   Pulse: 99   Temp: 97.8 °F (36.6 °C)   SpO2: 100%   Weight: 101 kg (223 lb 9.6 oz)      Physical Exam  Vitals reviewed.   Constitutional:       General: She is not in acute distress.     Appearance: Normal appearance.      Comments:  female who appears stated age and is present with her mother   HENT:      Head: Normocephalic and atraumatic.      Nose: Nose normal.   Eyes:      General: No scleral icterus.  Cardiovascular:      Rate and Rhythm: Normal rate and regular rhythm.      Heart sounds: No murmur heard.     No friction rub. No gallop.   Pulmonary:      Effort: Pulmonary effort is normal. No respiratory distress.      Breath sounds: Normal breath sounds. No rhonchi.   Abdominal:      General: There is no distension.      Palpations: Abdomen is soft. There is no mass.      Tenderness: There is no abdominal tenderness. There is no guarding or rebound.   Musculoskeletal:         General: No deformity.      Cervical back: Neck supple.      Right lower leg: No edema.      Left lower leg: No edema.   Lymphadenopathy:      Cervical: No cervical adenopathy.      Upper Body:      Right upper body: No supraclavicular or axillary adenopathy.      Left upper body: No supraclavicular or axillary adenopathy.   Skin:     General: Skin is warm and dry.      Coloration: Skin is not jaundiced.   Neurological:      Mental Status: She is alert and oriented to person, place, and time.      Gait: Gait normal.   Psychiatric:         Mood and Affect: Mood normal.         Behavior: Behavior normal.         Thought Content: Thought content normal.         Judgment: Judgment normal.         The ROS, past medical history, surgical history, family history, social history, and vitals were reviewed by myself and  "corrected as needed.      Labs/Imaging:  -CT of the chest without contrast performed 9/28/2023, personally reviewed, demonstrates a residual thymic gland measuring 2.7 x 2.4 cm.    Assessment/Plan:  18-year-old  female with a history of anxiety and active nicotine abuse who presents with an \"anterior mediastinal mass.\"  Her CT scan demonstrates residual thymic tissue that is age-appropriate.  I reviewed this CT scan with in-house radiologist, Dr. Star Houston who felt that this thymic tissue was appropriate for her age.  I will obtain a CT scan of the chest in 1 year for continued surveillance to rule out interval change.  Assuming this study is satisfactory, there is no need for further imaging.  I discussed with the patient the importance of nicotine cessation in her vape.  The patient understood this and was actively working with her primary care physician to pursue weight loss and nicotine/vape cessation.  The patient will return to clinic in 1 year to discuss the results of her repeat CT scan of the chest.    Patient Active Problem List   Diagnosis    Morbid (severe) obesity due to excess calories    Generalized anxiety disorder    Moderate episode of recurrent major depressive disorder    Heart palpitations    Other chest pain    Nicotine dependence    Vitamin D deficiency    Mass of mediastinum                      "

## 2023-10-31 ENCOUNTER — OFFICE VISIT (OUTPATIENT)
Dept: INTERNAL MEDICINE | Facility: CLINIC | Age: 18
End: 2023-10-31
Payer: COMMERCIAL

## 2023-10-31 VITALS
HEART RATE: 88 BPM | TEMPERATURE: 97.6 F | SYSTOLIC BLOOD PRESSURE: 112 MMHG | DIASTOLIC BLOOD PRESSURE: 82 MMHG | WEIGHT: 223 LBS | RESPIRATION RATE: 16 BRPM | BODY MASS INDEX: 42.14 KG/M2

## 2023-10-31 DIAGNOSIS — F33.1 MODERATE EPISODE OF RECURRENT MAJOR DEPRESSIVE DISORDER: Primary | ICD-10-CM

## 2023-10-31 DIAGNOSIS — F17.210 CIGARETTE NICOTINE DEPENDENCE WITHOUT COMPLICATION: ICD-10-CM

## 2023-10-31 DIAGNOSIS — F41.1 GENERALIZED ANXIETY DISORDER: ICD-10-CM

## 2023-10-31 NOTE — PROGRESS NOTES
Office Note     Name: Jose Francisco Mohan    : 2005     MRN: [id number]     Chief Complaint  Follow-up (Anxiety, depression, mass of mediastinum)    Subjective     History of Present Illness:  Jose Francisco Mohan is a 18 y.o. female who presents today for anxiety, depression and mass of mediastinum.    She would like help with smoking cessation. She plans to start wellbutrin as previously prescribed. She has mild depression, but overall well controlled. She is concerned about her weight she feels it contributes to her depression.     She has been unable to take Wellbutrin due to insurance coverage, plan to call about possible PA. She has not needed to take Vistaril, as her anxiety has improved while she is not currently in classes.    Review of Systems:   Review of Systems    Past Medical History:   Past Medical History:   Diagnosis Date    Anxiety     Depression     Obesity     UTI (urinary tract infection)        Past Surgical History:   Past Surgical History:   Procedure Laterality Date    EAR TUBES      LABIAL ADHESION LYSIS      WISDOM TOOTH EXTRACTION         Immunizations:   Immunization History   Administered Date(s) Administered    HPV Bivalent 2014    HPV Quadrivalent 2014    Meningococcal Conjugate 2014    Meningococcal Polysaccharide 2014    Tdap 2014        Medications:     Current Outpatient Medications:     hydrOXYzine pamoate (VISTARIL) 25 MG capsule, Take 1 capsule by mouth 3 (Three) Times a Day As Needed for Itching., Disp: 30 capsule, Rfl: 0    vitamin D (ERGOCALCIFEROL) 1.25 MG (24626 UT) capsule capsule, Take 1 capsule by mouth 1 (One) Time Per Week., Disp: 8 capsule, Rfl: 0    buPROPion XL (Wellbutrin XL) 150 MG 24 hr tablet, Take 1 tablet by mouth Daily. (Patient not taking: Reported on 10/24/2023), Disp: 30 tablet, Rfl: 1    Allergies:   Allergies   Allergen Reactions    Bactrim [Sulfamethoxazole-Trimethoprim] Rash       Family History:  "  Family History   Problem Relation Age of Onset    Anxiety disorder Mother     No Known Problems Maternal Grandmother     No Known Problems Maternal Grandfather     Other Paternal Grandmother         pulmonary HTN    COPD Paternal Grandfather        Social History:   Social History     Socioeconomic History    Marital status: Single    Number of children: 2   Tobacco Use    Smoking status: Every Day     Types: Electronic Cigarette    Smokeless tobacco: Never   Vaping Use    Vaping Use: Every day    Substances: Nicotine    Devices: Disposable   Substance and Sexual Activity    Alcohol use: No    Drug use: No    Sexual activity: Yes     Partners: Male     Birth control/protection: I.U.D.         Objective     Vital Signs  /82 (BP Location: Left arm, Patient Position: Sitting, Cuff Size: Adult)   Pulse 88   Temp 97.6 °F (36.4 °C) (Infrared)   Resp 16   Wt 101 kg (223 lb)   BMI 42.14 kg/m²   Estimated body mass index is 42.14 kg/m² as calculated from the following:    Height as of 9/26/23: 154.9 cm (61\").    Weight as of this encounter: 101 kg (223 lb).            Physical Exam  Vitals and nursing note reviewed.   Constitutional:       Appearance: Normal appearance.   Cardiovascular:      Rate and Rhythm: Normal rate and regular rhythm.      Pulses: Normal pulses.      Heart sounds: Normal heart sounds.   Pulmonary:      Effort: Pulmonary effort is normal.      Breath sounds: Normal breath sounds.   Musculoskeletal:         General: Normal range of motion.   Skin:     General: Skin is warm and dry.   Neurological:      General: No focal deficit present.      Mental Status: She is alert.   Psychiatric:         Mood and Affect: Mood normal.         Behavior: Behavior normal.          Assessment and Plan     1. Moderate episode of recurrent major depressive disorder    She was prescribed Wellbutrin.  The clinic will contact her insurance regarding coverage.     2. Cigarette nicotine dependence without " complication      3. Generalized anxiety disorder       Patient verbalized good understanding of diagnosis and treatment plan.  All questions answered to their satisfaction.      Red flag symptoms and indications to report to ER discussed with patient who verbalized good understanding.     Patient counseled on the side effects of prescribed medication and verbalized good understanding.  Recommended taking probiotics while taking antibiotics.  Patient is to call office for any new or worsening symptoms.  Patient verbalized understanding of indications to report to the ER.      Follow Up  No follow-ups on file.    GENESIS Porras [facility]  [address] [id number] [phone]  Transcribed from ambient dictation for Monica Baig PA-C by Aby Otoole.  10/31/23   14:34 EDT    Patient or patient representative verbalized consent to the visit recording.  I have personally performed the services described in this document as transcribed by the above individual, and it is both accurate and complete.

## 2023-11-18 ENCOUNTER — PATIENT MESSAGE (OUTPATIENT)
Dept: INTERNAL MEDICINE | Facility: CLINIC | Age: 18
End: 2023-11-18
Payer: COMMERCIAL

## 2023-11-18 DIAGNOSIS — F41.1 GENERALIZED ANXIETY DISORDER: ICD-10-CM

## 2023-11-18 DIAGNOSIS — F33.1 MODERATE EPISODE OF RECURRENT MAJOR DEPRESSIVE DISORDER: ICD-10-CM

## 2023-11-20 NOTE — TELEPHONE ENCOUNTER
From: Jose Francisco Mohan  To: Monica Baig  Sent: 11/18/2023 11:16 AM EST  Subject: Wellbutrin     Hey! I’ve been doing really well on my Wellbutrin I was curious if we could up the dosage I feel like it’s helping a little but might be better bumped up :)

## 2023-11-27 RX ORDER — BUPROPION HYDROCHLORIDE 300 MG/1
300 TABLET ORAL DAILY
Qty: 30 TABLET | Refills: 3 | Status: SHIPPED | OUTPATIENT
Start: 2023-11-27

## 2024-06-13 ENCOUNTER — OFFICE VISIT (OUTPATIENT)
Dept: INTERNAL MEDICINE | Facility: CLINIC | Age: 19
End: 2024-06-13
Payer: COMMERCIAL

## 2024-06-13 VITALS
BODY MASS INDEX: 43.46 KG/M2 | WEIGHT: 230 LBS | HEART RATE: 104 BPM | RESPIRATION RATE: 16 BRPM | TEMPERATURE: 97.5 F | SYSTOLIC BLOOD PRESSURE: 108 MMHG | DIASTOLIC BLOOD PRESSURE: 82 MMHG

## 2024-06-13 DIAGNOSIS — Z51.81 MEDICATION MONITORING ENCOUNTER: ICD-10-CM

## 2024-06-13 DIAGNOSIS — Z23 IMMUNIZATION DUE: Primary | ICD-10-CM

## 2024-06-13 DIAGNOSIS — L73.9 FOLLICULITIS: ICD-10-CM

## 2024-06-13 PROCEDURE — 99214 OFFICE O/P EST MOD 30 MIN: CPT | Performed by: PHYSICIAN ASSISTANT

## 2024-06-13 PROCEDURE — 90471 IMMUNIZATION ADMIN: CPT | Performed by: PHYSICIAN ASSISTANT

## 2024-06-13 PROCEDURE — 1126F AMNT PAIN NOTED NONE PRSNT: CPT | Performed by: PHYSICIAN ASSISTANT

## 2024-06-13 PROCEDURE — 90677 PCV20 VACCINE IM: CPT | Performed by: PHYSICIAN ASSISTANT

## 2024-06-13 RX ORDER — CHLORHEXIDINE GLUCONATE 40 MG/ML
20 SOLUTION TOPICAL WEEKLY
Qty: 236 ML | Refills: 3 | Status: SHIPPED | OUTPATIENT
Start: 2024-06-13

## 2024-06-13 RX ORDER — CEPHALEXIN 500 MG/1
500 CAPSULE ORAL 4 TIMES DAILY
Qty: 28 CAPSULE | Refills: 0 | Status: SHIPPED | OUTPATIENT
Start: 2024-06-13

## 2024-06-13 RX ORDER — PHENTERMINE HYDROCHLORIDE 37.5 MG/1
37.5 TABLET ORAL
Qty: 30 TABLET | Refills: 0 | Status: SHIPPED | OUTPATIENT
Start: 2024-06-13

## 2024-06-13 NOTE — PROGRESS NOTES
Office Note     Name: Jose Francisco Mohan    : 2005     MRN: [id number]     Chief Complaint  Rash (Abdomen)    Subjective   History of Present Illness  The patient is a 19-year-old female who presents today for rash of her stomach for approximately 2 months.    The patient reports experiencing red, painful, sore areas on her abdomen, measuring approximately 1 cm x 1 cm. These areas, which are painful, do not exhibit purulent drainage and seem to self-resolve. She has not sought any home treatment for these symptoms. She denies experiencing any fever or pruritus associated with the rash.    The patient reports significant difficulty in losing weight, despite not implementing any dietary or exercise interventions. She expresses a preference for trying Adipex-P.    Past Medical History:   Past Medical History:   Diagnosis Date    Anxiety     Depression     Obesity     UTI (urinary tract infection)        Past Surgical History:   Past Surgical History:   Procedure Laterality Date    EAR TUBES      LABIAL ADHESION LYSIS      WISDOM TOOTH EXTRACTION         Immunizations:   Immunization History   Administered Date(s) Administered    HPV Bivalent 2014    HPV Quadrivalent 2014    Meningococcal Conjugate 2014    Meningococcal Polysaccharide 2014    Tdap 2014        Medications:     Current Outpatient Medications:     cephalexin (Keflex) 500 MG capsule, Take 1 capsule by mouth 4 (Four) Times a Day., Disp: 28 capsule, Rfl: 0    Chlorhexidine Gluconate 4 % solution, Apply 0.6667 Applications topically 1 (One) Time Per Week., Disp: 236 mL, Rfl: 3    mupirocin (BACTROBAN) 2 % ointment, Apply 1 Application topically to the appropriate area as directed 3 (Three) Times a Day., Disp: 15 g, Rfl: 0    phentermine (Adipex-P) 37.5 MG tablet, Take 1 tablet by mouth Every Morning Before Breakfast., Disp: 30 tablet, Rfl: 0    Allergies:   Allergies   Allergen Reactions    Bactrim  "[Sulfamethoxazole-Trimethoprim] Rash    Wellbutrin [Bupropion] Dizziness       Family History:   Family History   Problem Relation Age of Onset    Anxiety disorder Mother     No Known Problems Maternal Grandmother     No Known Problems Maternal Grandfather     Other Paternal Grandmother         pulmonary HTN    COPD Paternal Grandfather        Social History:   Social History     Socioeconomic History    Marital status: Single    Number of children: 2   Tobacco Use    Smoking status: Every Day     Types: Electronic Cigarette     Start date: 2020    Smokeless tobacco: Never   Vaping Use    Vaping status: Every Day    Substances: Nicotine    Devices: Disposable   Substance and Sexual Activity    Alcohol use: No    Drug use: No    Sexual activity: Yes     Partners: Male     Birth control/protection: I.U.D.       Objective     Vital Signs  /82 (BP Location: Left arm, Patient Position: Sitting, Cuff Size: Adult)   Pulse 104   Temp 97.5 °F (36.4 °C) (Infrared)   Resp 16   Wt 104 kg (230 lb)   BMI 43.46 kg/m²   Estimated body mass index is 43.46 kg/m² as calculated from the following:    Height as of 9/26/23: 154.9 cm (61\").    Weight as of this encounter: 104 kg (230 lb).            Physical Exam  Vitals and nursing note reviewed.   Constitutional:       Appearance: Normal appearance. She is obese.   Cardiovascular:      Rate and Rhythm: Normal rate and regular rhythm.   Pulmonary:      Effort: Pulmonary effort is normal.      Breath sounds: Normal breath sounds.   Skin:     General: Skin is warm and dry.   Neurological:      Mental Status: She is alert.   Psychiatric:         Mood and Affect: Mood normal.         Behavior: Behavior normal.          Physical Exam      Assessment and Plan     Assessment & Plan  1. Stomach rash.  A comprehensive discussion was held with the patient regarding the importance of food tracking, incorporating an increase in protein intake, a reduction in carbohydrate intake, and " tracking her steps. She was further encouraged to increase her daily step count to 10,000 and consume 100 ounces of water. An extensive discussion was held regarding weight loss medication options. The patient reported experiencing dizziness with Wellbutrin, leading to her preference for Adipex-P. A CSA and Talox screening will be conducted today.    Problem List Items Addressed This Visit    None  Visit Diagnoses       Immunization due    -  Primary    Relevant Orders    Pneumococcal Conjugate Vaccine 20-Valent (PCV20)    Folliculitis        Relevant Medications    cephalexin (Keflex) 500 MG capsule    mupirocin (BACTROBAN) 2 % ointment    Chlorhexidine Gluconate 4 % solution    Medication monitoring encounter        Relevant Orders    ToxAssure Flex 23, Ur -    Body mass index (BMI) of 40.0 to 44.9 in adult        Relevant Medications    phentermine (Adipex-P) 37.5 MG tablet              Reviewed medications, potential side effects and signs and symptoms to report. Discussed risk versus benefits of treatment plan with patient and/or family-including medications, labs and radiology that may be ordered. Addressed questions and concerns during visit. Patient and/or family verbalized understanding and agree with plan. Instructed to call the office with any questions and report to ER with any life-threatening symptoms.     Follow Up  Return in about 4 weeks (around 7/11/2024) for Annual .    Patient or patient representative verbalized consent for the use of Ambient Listening during the visit with  Monica Baig PA-C for chart documentation. 6/13/2024  11:24 EDT    GENESIS Porras [facility] INTERNAL MEDICINE & PED[facility]ATRICS  [address] [id number] [phone]

## 2024-06-13 NOTE — PROGRESS NOTES
Office Note     Name: Jose Francisco Mohan    : 2005     MRN: [id number]     Chief Complaint  Rash (Abdomen)    Subjective     History of Present Illness:  Jose Francisco Mohan is a 19 y.o. female who presents today for ***    Review of Systems:   Review of Systems    Past Medical History:   Past Medical History:   Diagnosis Date    Anxiety     Depression     Obesity     UTI (urinary tract infection)        Past Surgical History:   Past Surgical History:   Procedure Laterality Date    EAR TUBES      LABIAL ADHESION LYSIS      WISDOM TOOTH EXTRACTION         Immunizations:   Immunization History   Administered Date(s) Administered    HPV Bivalent 2014    HPV Quadrivalent 2014    Meningococcal Conjugate 2014    Meningococcal Polysaccharide 2014    Tdap 2014        Medications:     Current Outpatient Medications:     cephalexin (Keflex) 500 MG capsule, Take 1 capsule by mouth 4 (Four) Times a Day., Disp: 28 capsule, Rfl: 0    Chlorhexidine Gluconate 4 % solution, Apply 0.6667 Applications topically 1 (One) Time Per Week., Disp: 236 mL, Rfl: 3    mupirocin (BACTROBAN) 2 % ointment, Apply 1 Application topically to the appropriate area as directed 3 (Three) Times a Day., Disp: 15 g, Rfl: 0    Allergies:   Allergies   Allergen Reactions    Bactrim [Sulfamethoxazole-Trimethoprim] Rash    Wellbutrin [Bupropion] Dizziness       Family History:   Family History   Problem Relation Age of Onset    Anxiety disorder Mother     No Known Problems Maternal Grandmother     No Known Problems Maternal Grandfather     Other Paternal Grandmother         pulmonary HTN    COPD Paternal Grandfather        Social History:   Social History     Socioeconomic History    Marital status: Single    Number of children: 2   Tobacco Use    Smoking status: Every Day     Types: Electronic Cigarette     Start date:     Smokeless tobacco: Never   Vaping Use    Vaping status: Every Day     "Substances: Nicotine    Devices: Disposable   Substance and Sexual Activity    Alcohol use: No    Drug use: No    Sexual activity: Yes     Partners: Male     Birth control/protection: I.U.D.         Objective     Vital Signs  /82 (BP Location: Left arm, Patient Position: Sitting, Cuff Size: Adult)   Pulse 104   Temp 97.5 °F (36.4 °C) (Infrared)   Resp 16   Wt 104 kg (230 lb)   BMI 43.46 kg/m²   Estimated body mass index is 43.46 kg/m² as calculated from the following:    Height as of 9/26/23: 154.9 cm (61\").    Weight as of this encounter: 104 kg (230 lb).            Physical Exam     Assessment and Plan     1. Immunization due  ***  - Pneumococcal Conjugate Vaccine 20-Valent (PCV20)    2. Folliculitis  ***  - cephalexin (Keflex) 500 MG capsule; Take 1 capsule by mouth 4 (Four) Times a Day.  Dispense: 28 capsule; Refill: 0  - mupirocin (BACTROBAN) 2 % ointment; Apply 1 Application topically to the appropriate area as directed 3 (Three) Times a Day.  Dispense: 15 g; Refill: 0  - Chlorhexidine Gluconate 4 % solution; Apply 0.6667 Applications topically 1 (One) Time Per Week.  Dispense: 236 mL; Refill: 3       Counseling was given to {person:[id number]} for the following topics: {topic:55199}.    Follow Up  No follow-ups on file.    GENESIS Porras [facility] INTERNAL MEDICINE & PEDIATRICS  [address] [id number] [phone]  "

## 2024-06-13 NOTE — LETTER
[facility]  Vaccine Consent Form    Patient Name:  Jose Francisco Mohan  Patient :  2005     Vaccine(s) Ordered    Pneumococcal Conjugate Vaccine 20-Valent (PCV20)        Screening Checklist  The following questions should be completed prior to vaccination. If you answer “yes” to any question, it does not necessarily mean you should not be vaccinated. It just means we may need to clarify or ask more questions. If a question is unclear, please ask your healthcare provider to explain it.    Yes No   Any fever or moderate to severe illness today (mild illness and/or antibiotic treatment are not contraindications)?     Do you have a history of a serious reaction to any previous vaccinations, such as anaphylaxis, encephalopathy within 7 days, Guillain-[place] syndrome within 6 weeks, seizure?     Have you received any live vaccine(s) (e.g MMR, BEN) or any other vaccines in the last month (to ensure duplicate doses aren't given)?     Do you have an anaphylactic allergy to latex (DTaP, DTaP-IPV, Hep A, Hep B, MenB, RV, Td, Tdap), baker’s yeast (Hep B, HPV), polysorbates (RSV, nirsevimab, PCV 20, Rotavirrus, Tdap, Shingrix), or gelatin (BEN, MMR)?     Do you have an anaphylactic allergy to neomycin (Rabies, BEN, MMR, IPV, Hep A), polymyxin B (IPV), or streptomycin (IPV)?      Any cancer, leukemia, AIDS, or other immune system disorder? (BEN, MMR, RV)     Do you have a parent, brother, or sister with an immune system problem (if immune competence of vaccine recipient clinically verified, can proceed)? (MMR, BEN)     Any recent steroid treatments for >2 weeks, chemotherapy, or radiation treatment? (BEN, MMR)     Have you received antibody-containing blood transfusions or IVIG in the past 11 months (recommended interval is dependent on product)? (MMR, BEN)     Have you taken antiviral drugs (acyclovir, famciclovir, valacyclovir for BEN) in the last 24 or 48 hours, respectively?      Are you pregnant or planning  "to become pregnant within 1 month? (BEN, MMR, HPV, IPV, MenB, Abrexvy; For Hep B- refer to Engerix-B; For RSV - Abrysvo is indicated for 32-36 weeks of pregnancy from September to January)     For infants, have you ever been told your child has had intussusception or a medical emergency involving obstruction of the intestine (Rotavirus)? If not for an infant, can skip this question.         *Ordering Physicians/APC should be consulted if \"yes\" is checked by the patient or guardian above.  I have received, read, and understand the Vaccine Information Statement (VIS) for each vaccine ordered.  I have considered my or my child's health status as well as the health status of my close contacts.  I have taken the opportunity to discuss my vaccine questions with my or my child's health care provider.   I have requested that the ordered vaccine(s) be given to me or my child.  I understand the benefits and risks of the vaccines.  I understand that I should remain in the clinic for 15 minutes after receiving the vaccine(s).  _________________________________________________________  Signature of Patient or Parent/Legal Guardian ____________________  Date     "

## 2024-06-21 LAB
6MAM SAL CFM-MCNC: NOT DETECTED NG/ML
6MAM SAL QL CFM: NEGATIVE
ALPRAZ SAL CFM-MCNC: NOT DETECTED NG/ML
AMPHET SAL CFM-MCNC: NOT DETECTED NG/ML
AMPHET SAL QL CFM: NEGATIVE
ANTICONVULSANTS SAL QL CFM: NEGATIVE
BENZODIAZ SAL QL CFM: NEGATIVE
BUPRENORPHINE SAL CFM-MCNC: NOT DETECTED NG/ML
BUPRENORPHINE SAL QL CFM: NEGATIVE
BZE SAL CFM-MCNC: NOT DETECTED NG/ML
CANNABINOIDS SAL QL SCN: NEGATIVE
CARBOXYTHC SAL CFM-MCNC: NOT DETECTED NG/ML
CARISOPRODOL SAL CFM-MCNC: NOT DETECTED NG/ML
CLONAZEPAM SAL CFM-MCNC: NOT DETECTED NG/ML
COC+MET SAL QL CFM: NEGATIVE
COCAINE SAL CFM-MCNC: NOT DETECTED NG/ML
CODEINE SAL CFM-MCNC: NOT DETECTED NG/ML
COTININE SAL CFM-MCNC: 287 NG/ML
COTININE SAL QL CFM: ABNORMAL
CYCLOBENZAPRINE SAL CFM-MCNC: NOT DETECTED NG/ML
DHC SAL CFM-MCNC: NOT DETECTED NG/ML
DIAZEPAM SAL CFM-MCNC: NOT DETECTED NG/ML
DULOXETINE SAL CFM-MCNC: NOT DETECTED NG/ML
DULOXETINE SAL QL CFM: NEGATIVE
EDDP SAL QL CFM: NOT DETECTED NG/ML
FENTANYL SAL CFM-MCNC: NEGATIVE NG/ML
FENTANYL SAL QL SCN: NOT DETECTED NG/ML
FLUNITRAZEPAM SAL CFM-MCNC: NOT DETECTED NG/ML
FLURAZEPAM SAL CFM-MCNC: NOT DETECTED NG/ML
GABAPENTIN SAL CFM-MCNC: NOT DETECTED UG/ML
HYDROCODONE SAL CFM-MCNC: NOT DETECTED NG/ML
HYDROMORPHONE SAL CFM-MCNC: NOT DETECTED NG/ML
HYPNOTICS SAL QL CFM: NEGATIVE
LORAZEPAM SAL-MCNC: NOT DETECTED NG/ML
MDMA SAL CFM-MCNC: NOT DETECTED NG/ML
MEPERIDINE SAL CFM-MCNC: NOT DETECTED NG/ML
MEPROBAMATE SAL CFM-MCNC: NOT DETECTED NG/ML
METHADONE SAL CFM-MCNC: NOT DETECTED NG/ML
METHADONE SAL QL CFM: NEGATIVE
METHAMPHET SAL CFM-MCNC: NOT DETECTED NG/ML
MIDAZOLAM SAL CFM-MCNC: NOT DETECTED NG/ML
MORPHINE SAL CFM-MCNC: NOT DETECTED NG/ML
MUSCLE RELAXANTS: NEGATIVE
NARCOTICS SAL QL CFM: NEGATIVE
NORBUPRENORPHINE SAL CFM-MCNC: NOT DETECTED NG/ML
NORDIAZEPAM SAL-MCNC: NOT DETECTED NG/ML
NORHYDROCODONE SAL CFM-MCNC: NOT DETECTED NG/ML
NOROXYCODONE SAL CFM-MCNC: NOT DETECTED NG/ML
OPIATES SAL QL CFM: NEGATIVE
OXAZEPAM SAL CFM-MCNC: NOT DETECTED NG/ML
OXYCODONE SAL CFM-MCNC: NOT DETECTED NG/ML
OXYCODONE SAL QL CFM: NEGATIVE
OXYMORPHONE SAL CFM-MCNC: NOT DETECTED NG/ML
PCP SAL CFM-MCNC: NOT DETECTED NG/ML
PCP SAL QL CFM: NEGATIVE
PREGABALIN SAL CFM-MCNC: NOT DETECTED UG/ML
PROPOXYPH SAL CFM-MCNC: NOT DETECTED NG/ML
TAPENTADOL SAL CFM-MCNC: NOT DETECTED NG/ML
TAPENTADOL SAL QL SCN: NEGATIVE
TEMAZEPAM SAL CFM-MCNC: NOT DETECTED NG/ML
TRAMADOL SAL CFM-MCNC: NOT DETECTED NG/ML
TRIAZOLAM SAL CFM-MCNC: NOT DETECTED NG/ML
ZOLPIDEM SAL CFM-MCNC: NOT DETECTED NG/ML

## 2024-07-18 ENCOUNTER — PATIENT MESSAGE (OUTPATIENT)
Dept: INTERNAL MEDICINE | Facility: CLINIC | Age: 19
End: 2024-07-18

## 2024-07-22 NOTE — TELEPHONE ENCOUNTER
From: Jose Francisco Mohan  To: Monica Baig  Sent: 7/18/2024 7:21 PM EDT  Subject: Work    I went to urgent care Monday with a stomach bug and they only gave me a note from Monday - Wednesday. I still haven’t felt good today and had to miss work. Could I get a note for today (Thursday the 18th). Also I have to reschedule my appointment I haven’t started my medicine yet.

## 2024-08-30 DIAGNOSIS — J98.59 MASS OF MEDIASTINUM: Primary | ICD-10-CM

## 2024-10-31 ENCOUNTER — HOSPITAL ENCOUNTER (OUTPATIENT)
Dept: CT IMAGING | Facility: HOSPITAL | Age: 19
Discharge: HOME | End: 2024-10-31
Admitting: NURSE PRACTITIONER
Payer: COMMERCIAL

## 2024-10-31 DIAGNOSIS — J98.59 MASS OF MEDIASTINUM: ICD-10-CM

## 2024-10-31 PROCEDURE — 25510000001 IOPAMIDOL 61 % SOLUTION: Performed by: NURSE PRACTITIONER

## 2024-10-31 PROCEDURE — 71270 CT THORAX DX C-/C+: CPT

## 2024-10-31 RX ORDER — IOPAMIDOL 612 MG/ML
85 INJECTION, SOLUTION INTRAVASCULAR
Status: COMPLETED | OUTPATIENT
Start: 2024-10-31 | End: 2024-10-31

## 2024-10-31 RX ADMIN — IOPAMIDOL 85 ML: 612 INJECTION, SOLUTION INTRAVENOUS at 14:10

## 2024-12-12 ENCOUNTER — OFFICE VISIT (OUTPATIENT)
Dept: CARDIAC SURGERY | Facility: CLINIC | Age: 19
End: 2024-12-12
Payer: COMMERCIAL

## 2024-12-12 VITALS
SYSTOLIC BLOOD PRESSURE: 126 MMHG | DIASTOLIC BLOOD PRESSURE: 93 MMHG | HEIGHT: 60 IN | WEIGHT: 231 LBS | BODY MASS INDEX: 45.35 KG/M2 | OXYGEN SATURATION: 98 % | HEART RATE: 97 BPM | TEMPERATURE: 97.4 F

## 2024-12-12 DIAGNOSIS — J98.59 MASS OF MEDIASTINUM: Primary | ICD-10-CM

## 2024-12-12 PROCEDURE — 1159F MED LIST DOCD IN RCRD: CPT | Performed by: NURSE PRACTITIONER

## 2024-12-12 PROCEDURE — 1160F RVW MEDS BY RX/DR IN RCRD: CPT | Performed by: NURSE PRACTITIONER

## 2024-12-12 PROCEDURE — 99213 OFFICE O/P EST LOW 20 MIN: CPT | Performed by: NURSE PRACTITIONER

## 2024-12-12 NOTE — PROGRESS NOTES
"     [facility] Cardiothoracic Surgery Office Follow Up Note     Date of Encounter: 2024     Name: Jose Francisco Mohan  : 2005     Referred By: No ref. provider found  PCP: Boaz Sauer MD    Chief Complaint:    Chief Complaint   Patient presents with    mediastinal mass     1 year follow up for a mediastinal mass.       Subjective      History of Present Illness:    JoseF rancisco Mohan is a 19 y.o. female followed by Dr. Sauer for anterior mediastinal mass.  PMH: Morbid obesity (BMI 45.1 kg/m²), PATTI, tobacco use, and mediastinal mass.  Last seen in clinic 10/24/2023 by Dr. Sauer with CT imaging demonstrating 2.7 x 2.4 residual thymic gland.  Dr. Sauer noted residual thymic tissue is age-appropriate.  He recommended follow-up imaging with CT chest in 1 year for surveillance to rule out interval change.  If this study indicates stable thymus tissue, no further workup or surveillance indicated.  Patient returns with CT chest performed 10/31/2024.  Patient states she feels \"the same\" as last year's visit.      Review of Systems:  Review of Systems   Constitutional: Negative. Negative for chills, decreased appetite, diaphoresis, fever, malaise/fatigue, night sweats and weight loss.   HENT:  Negative for congestion, hoarse voice and sore throat.    Cardiovascular:  Positive for chest pain and palpitations. Negative for dyspnea on exertion, irregular heartbeat, leg swelling, near-syncope, orthopnea, paroxysmal nocturnal dyspnea and syncope.   Respiratory:  Positive for shortness of breath. Negative for cough, hemoptysis, sleep disturbances due to breathing, snoring, sputum production and wheezing.    Hematologic/Lymphatic: Negative.  Negative for adenopathy and bleeding problem. Does not bruise/bleed easily.   Skin: Negative.  Negative for color change, dry skin, itching, poor wound healing and rash.   Musculoskeletal:  Positive for back pain. Negative for falls and muscle weakness. "   Gastrointestinal: Negative.  Negative for abdominal pain, anorexia, constipation, diarrhea, nausea and vomiting.   Genitourinary: Negative.  Negative for dysuria and frequency.   Neurological:  Positive for dizziness and headaches. Negative for difficulty with concentration, numbness, paresthesias, seizures and weakness.   Psychiatric/Behavioral:  Positive for depression. Negative for altered mental status and memory loss. The patient is nervous/anxious. The patient does not have insomnia.    Allergic/Immunologic: Negative.  Negative for persistent infections.       I have reviewed the following portions of the patient's history: problem list, current medications, allergies, past surgical history, past medical history, past social history, past family history, and ROS and confirm it's accurate.    Allergies:  Allergies   Allergen Reactions    Bactrim [Sulfamethoxazole-Trimethoprim] Rash    Wellbutrin [Bupropion] Dizziness       Medications:      Current Outpatient Medications:     cephalexin (Keflex) 500 MG capsule, Take 1 capsule by mouth 4 (Four) Times a Day. (Patient not taking: Reported on 12/12/2024), Disp: 28 capsule, Rfl: 0    Chlorhexidine Gluconate 4 % solution, Apply 0.6667 Applications topically 1 (One) Time Per Week. (Patient not taking: Reported on 12/12/2024), Disp: 236 mL, Rfl: 3    mupirocin (BACTROBAN) 2 % ointment, Apply 1 Application topically to the appropriate area as directed 3 (Three) Times a Day. (Patient not taking: Reported on 12/12/2024), Disp: 15 g, Rfl: 0    phentermine (Adipex-P) 37.5 MG tablet, Take 1 tablet by mouth Every Morning Before Breakfast. (Patient not taking: Reported on 12/12/2024), Disp: 30 tablet, Rfl: 0    History:   Past Medical History:   Diagnosis Date    Anxiety     Depression     Obesity     UTI (urinary tract infection)        Past Surgical History:   Procedure Laterality Date    EAR TUBES      LABIAL ADHESION LYSIS      WISDOM TOOTH EXTRACTION         Social  "History     Socioeconomic History    Marital status: Single    Number of children: 2   Tobacco Use    Smoking status: Every Day     Types: Electronic Cigarette     Start date: 2020    Smokeless tobacco: Never   Vaping Use    Vaping status: Every Day    Substances: Nicotine    Devices: Disposable   Substance and Sexual Activity    Alcohol use: No    Drug use: No    Sexual activity: Yes     Partners: Male     Birth control/protection: I.U.D.        Family History   Problem Relation Age of Onset    Anxiety disorder Mother     No Known Problems Maternal Grandmother     No Known Problems Maternal Grandfather     Other Paternal Grandmother         pulmonary HTN    COPD Paternal Grandfather        Objective   Physical Exam:  Vitals:    12/12/24 1402   BP: 126/93   BP Location: Right arm   Patient Position: Sitting   Pulse: 97   Temp: 97.4 °F (36.3 °C)   SpO2: 98%   Weight: 105 kg (231 lb)   Height: 152.4 cm (60\")  Comment: patient reports      Body mass index is 45.11 kg/m².    Physical Exam  Vitals reviewed.   Constitutional:       General: She is not in acute distress.     Appearance: She is obese. She is not toxic-appearing.   HENT:      Head: Normocephalic and atraumatic.   Eyes:      General: Lids are normal.      Conjunctiva/sclera: Conjunctivae normal.      Pupils: Pupils are equal, round, and reactive to light.   Neck:      Vascular: No carotid bruit or JVD.   Cardiovascular:      Rate and Rhythm: Normal rate and regular rhythm.      Heart sounds: S1 normal and S2 normal. No murmur heard.  Pulmonary:      Effort: Pulmonary effort is normal. No respiratory distress.      Breath sounds: Normal breath sounds.   Musculoskeletal:         General: Normal range of motion.      Cervical back: Normal range of motion and neck supple.   Lymphadenopathy:      Cervical: No cervical adenopathy.      Upper Body:      Right upper body: No supraclavicular adenopathy.      Left upper body: No supraclavicular adenopathy.   Skin:     " General: Skin is warm and dry.      Capillary Refill: Capillary refill takes less than 2 seconds.   Neurological:      General: No focal deficit present.      Mental Status: She is alert and oriented to person, place, and time.   Psychiatric:         Attention and Perception: Attention normal.         Mood and Affect: Mood normal.         Speech: Speech normal.         Behavior: Behavior is cooperative.     Imaging/Labs:  CT CHEST W WO CONTRAST DIAGNOSTIC: 10/31/2024  (Personally reviewed and agree that mass appears unchanged from prior exam)  Findings:  Hilum and Mediastinum: There is a mass in the anterior superior mediastinum which on current exam measures 1.4 x 2.7 cm (image 25 of series 3). On prior study this measured 1.1 x 2.6 cm and overall appears minimally changed. The mass is solid with some   enhancement on postcontrast imaging. The heart is otherwise normal in size. The aorta is normal in caliber. No significant coronary artery calcification. No pericardial effusion.   No pathologic mediastinal, hilar or axillary lymph nodes.   Lung Parenchyma and Pleura: No focal consolidations.  No suspicious pulmonary nodules.  No endobronchial lesions.  No significant pleural effusions.   Upper Abdomen: No acute process.    Soft tissues: Unremarkable.   Osseous structures: No aggressive focal lytic or sclerotic osseous lesions.   IMPRESSION:   1. No significant interval change in a superior mediastinal mass. This may represent residual thymic tissue.   Electronically Signed: Remedios Sharma MD 11/4/2024        CT CHEST WO CONTRAST DIAGNOSTIC: 9/28/2023  (Personally reviewed)  Findings:  There is no pneumothorax, pleural effusion or focal airspace consolidation. No significant pleural disease. No suspicious lung nodules.  Central and segmental airways appear patent.   There is a triangular soft tissue density mass in the anterior superior mediastinum measuring 24 x 27 mm, likely residual or reactivated thymic  tissue. The thyroid is within normal limits. The trachea and esophagus appear unremarkable.  Heart size is   normal.  No significant pericardial effusion.  No evidence of mediastinal or hilar lymphadenopathy.  The aortic branch vessels, aorta and pulmonary artery appear within normal limits.   Subcutaneous fat and underlying musculature appear within normal limits.  There are no acute osseous abnormalities or destructive bone lesions. 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   Electronically Signed: Ayush Gill MD 9/29/2023     Assessment / Plan      Assessment / Plan:  1. Mass of mediastinum  - 19 y.o. female followed by Dr. Sauer for anterior mediastinal mass.    - PMH: Morbid obesity (BMI 45.1 kg/m²), PATTI, tobacco use, and mediastinal mass.   - Seen in clinic 10/24/2023 by Dr. Sauer: CT imaging demonstrating 2.7 x 2.4 residual thymic gland.    - Dr. Sauer noted residual thymic tissue is age-appropriate and recommended follow-up imaging with CT chest in 1 year for surveillance to rule out interval change.  If this study indicates stable thymus tissue, no further workup or surveillance indicated.    - Current CT imaging reviewed and compared to CT 10/2023:  agree w/ Radiology: triangular superior mediastinal mass appears unchanged.    - Follow up with PCP  - No further surveillance required.     Follow Up:   Return PRN new problem @ request of PCP or other specialty.   Or sooner for any further concerns or worsening sign and symptoms. If unable to reach us in the office please dial 911 or go to the nearest emergency department.      SALOME Cunha  [facility] Cardiothoracic Surgery    Time Spent: I spent 27 minutes caring for Torance on this date of service. This time includes time spent by me in the following activities: preparing for the visit, reviewing tests, obtaining and/or  reviewing a separately obtained history, performing a medically appropriate examination and/or evaluation, counseling and educating the patient/family/caregiver, documenting information in the medical record, independently interpreting results and communicating that information with the patient/family/caregiver, and care coordination.

## 2025-01-22 ENCOUNTER — LAB (OUTPATIENT)
Age: 20
End: 2025-01-22
Payer: COMMERCIAL

## 2025-01-22 ENCOUNTER — OFFICE VISIT (OUTPATIENT)
Age: 20
End: 2025-01-22
Payer: COMMERCIAL

## 2025-01-22 VITALS
DIASTOLIC BLOOD PRESSURE: 68 MMHG | SYSTOLIC BLOOD PRESSURE: 124 MMHG | HEIGHT: 62 IN | BODY MASS INDEX: 43.23 KG/M2 | OXYGEN SATURATION: 98 % | HEART RATE: 102 BPM | WEIGHT: 234.9 LBS

## 2025-01-22 DIAGNOSIS — R73.9 HYPERGLYCEMIA: Primary | ICD-10-CM

## 2025-01-22 DIAGNOSIS — R53.83 OTHER FATIGUE: ICD-10-CM

## 2025-01-22 DIAGNOSIS — E66.01 MORBID (SEVERE) OBESITY DUE TO EXCESS CALORIES: ICD-10-CM

## 2025-01-22 DIAGNOSIS — E55.9 VITAMIN D DEFICIENCY: ICD-10-CM

## 2025-01-22 DIAGNOSIS — F41.1 GENERALIZED ANXIETY DISORDER: ICD-10-CM

## 2025-01-22 LAB
BASOPHILS # BLD AUTO: 0.08 10*3/MM3 (ref 0–0.2)
BASOPHILS NFR BLD AUTO: 1.1 % (ref 0–1.5)
DEPRECATED RDW RBC AUTO: 40.5 FL (ref 37–54)
EOSINOPHIL # BLD AUTO: 0.38 10*3/MM3 (ref 0–0.4)
EOSINOPHIL NFR BLD AUTO: 5.2 % (ref 0.3–6.2)
ERYTHROCYTE [DISTWIDTH] IN BLOOD BY AUTOMATED COUNT: 13.1 % (ref 12.3–15.4)
EXPIRATION DATE: NORMAL
HBA1C MFR BLD: 5.3 % (ref 4.5–5.7)
HCT VFR BLD AUTO: 42 % (ref 34–46.6)
HGB BLD-MCNC: 14 G/DL (ref 12–15.9)
IMM GRANULOCYTES # BLD AUTO: 0.01 10*3/MM3 (ref 0–0.05)
IMM GRANULOCYTES NFR BLD AUTO: 0.1 % (ref 0–0.5)
LYMPHOCYTES # BLD AUTO: 1.77 10*3/MM3 (ref 0.7–3.1)
LYMPHOCYTES NFR BLD AUTO: 24.4 % (ref 19.6–45.3)
Lab: NORMAL
MCH RBC QN AUTO: 28.3 PG (ref 26.6–33)
MCHC RBC AUTO-ENTMCNC: 33.3 G/DL (ref 31.5–35.7)
MCV RBC AUTO: 84.8 FL (ref 79–97)
MONOCYTES # BLD AUTO: 0.42 10*3/MM3 (ref 0.1–0.9)
MONOCYTES NFR BLD AUTO: 5.8 % (ref 5–12)
NEUTROPHILS NFR BLD AUTO: 4.59 10*3/MM3 (ref 1.7–7)
NEUTROPHILS NFR BLD AUTO: 63.4 % (ref 42.7–76)
NRBC BLD AUTO-RTO: 0 /100 WBC (ref 0–0.2)
PLATELET # BLD AUTO: 358 10*3/MM3 (ref 140–450)
PMV BLD AUTO: 11 FL (ref 6–12)
RBC # BLD AUTO: 4.95 10*6/MM3 (ref 3.77–5.28)
TSH SERPL DL<=0.05 MIU/L-ACNC: 0.9 UIU/ML (ref 0.27–4.2)
WBC NRBC COR # BLD AUTO: 7.25 10*3/MM3 (ref 3.4–10.8)

## 2025-01-22 PROCEDURE — 1159F MED LIST DOCD IN RCRD: CPT | Performed by: STUDENT IN AN ORGANIZED HEALTH CARE EDUCATION/TRAINING PROGRAM

## 2025-01-22 PROCEDURE — 36415 COLL VENOUS BLD VENIPUNCTURE: CPT | Performed by: STUDENT IN AN ORGANIZED HEALTH CARE EDUCATION/TRAINING PROGRAM

## 2025-01-22 PROCEDURE — 82306 VITAMIN D 25 HYDROXY: CPT | Performed by: STUDENT IN AN ORGANIZED HEALTH CARE EDUCATION/TRAINING PROGRAM

## 2025-01-22 PROCEDURE — 82728 ASSAY OF FERRITIN: CPT | Performed by: STUDENT IN AN ORGANIZED HEALTH CARE EDUCATION/TRAINING PROGRAM

## 2025-01-22 PROCEDURE — 83540 ASSAY OF IRON: CPT | Performed by: STUDENT IN AN ORGANIZED HEALTH CARE EDUCATION/TRAINING PROGRAM

## 2025-01-22 PROCEDURE — 83036 HEMOGLOBIN GLYCOSYLATED A1C: CPT | Performed by: STUDENT IN AN ORGANIZED HEALTH CARE EDUCATION/TRAINING PROGRAM

## 2025-01-22 PROCEDURE — 84466 ASSAY OF TRANSFERRIN: CPT | Performed by: STUDENT IN AN ORGANIZED HEALTH CARE EDUCATION/TRAINING PROGRAM

## 2025-01-22 PROCEDURE — 1126F AMNT PAIN NOTED NONE PRSNT: CPT | Performed by: STUDENT IN AN ORGANIZED HEALTH CARE EDUCATION/TRAINING PROGRAM

## 2025-01-22 PROCEDURE — 1160F RVW MEDS BY RX/DR IN RCRD: CPT | Performed by: STUDENT IN AN ORGANIZED HEALTH CARE EDUCATION/TRAINING PROGRAM

## 2025-01-22 PROCEDURE — 80050 GENERAL HEALTH PANEL: CPT | Performed by: STUDENT IN AN ORGANIZED HEALTH CARE EDUCATION/TRAINING PROGRAM

## 2025-01-22 PROCEDURE — 99214 OFFICE O/P EST MOD 30 MIN: CPT | Performed by: STUDENT IN AN ORGANIZED HEALTH CARE EDUCATION/TRAINING PROGRAM

## 2025-01-22 PROCEDURE — 3044F HG A1C LEVEL LT 7.0%: CPT | Performed by: STUDENT IN AN ORGANIZED HEALTH CARE EDUCATION/TRAINING PROGRAM

## 2025-01-22 RX ORDER — HYDROXYZINE HYDROCHLORIDE 25 MG/1
25 TABLET, FILM COATED ORAL 3 TIMES DAILY PRN
Qty: 90 TABLET | Refills: 2 | Status: SHIPPED | OUTPATIENT
Start: 2025-01-22

## 2025-01-23 LAB
25(OH)D3 SERPL-MCNC: 23.1 NG/ML (ref 30–100)
ALBUMIN SERPL-MCNC: 4.5 G/DL (ref 3.5–5.2)
ALBUMIN/GLOB SERPL: 1.3 G/DL
ALP SERPL-CCNC: 103 U/L (ref 39–117)
ALT SERPL W P-5'-P-CCNC: 27 U/L (ref 1–33)
ANION GAP SERPL CALCULATED.3IONS-SCNC: 12 MMOL/L (ref 5–15)
AST SERPL-CCNC: 21 U/L (ref 1–32)
BILIRUB SERPL-MCNC: 0.4 MG/DL (ref 0–1.2)
BUN SERPL-MCNC: 10 MG/DL (ref 6–20)
BUN/CREAT SERPL: 13.9 (ref 7–25)
CALCIUM SPEC-SCNC: 9.7 MG/DL (ref 8.6–10.5)
CHLORIDE SERPL-SCNC: 101 MMOL/L (ref 98–107)
CO2 SERPL-SCNC: 24 MMOL/L (ref 22–29)
CREAT SERPL-MCNC: 0.72 MG/DL (ref 0.57–1)
EGFRCR SERPLBLD CKD-EPI 2021: 122.2 ML/MIN/1.73
FERRITIN SERPL-MCNC: 48.6 NG/ML (ref 13–150)
GLOBULIN UR ELPH-MCNC: 3.4 GM/DL
GLUCOSE SERPL-MCNC: 80 MG/DL (ref 65–99)
IRON 24H UR-MRATE: 53 MCG/DL (ref 37–145)
IRON SATN MFR SERPL: 13 % (ref 20–50)
POTASSIUM SERPL-SCNC: 4.2 MMOL/L (ref 3.5–5.2)
PROT SERPL-MCNC: 7.9 G/DL (ref 6–8.5)
SODIUM SERPL-SCNC: 137 MMOL/L (ref 136–145)
TIBC SERPL-MCNC: 410 MCG/DL (ref 298–536)
TRANSFERRIN SERPL-MCNC: 275 MG/DL (ref 200–360)

## 2025-01-23 RX ORDER — FERROUS SULFATE 324(65)MG
324 TABLET, DELAYED RELEASE (ENTERIC COATED) ORAL
Qty: 30 TABLET | Refills: 2 | Status: SHIPPED | OUTPATIENT
Start: 2025-01-23

## 2025-01-29 ENCOUNTER — PATIENT MESSAGE (OUTPATIENT)
Age: 20
End: 2025-01-29
Payer: COMMERCIAL

## 2025-02-03 RX ORDER — PHENTERMINE HYDROCHLORIDE 37.5 MG/1
37.5 TABLET ORAL
Qty: 90 TABLET | Refills: 0 | Status: SHIPPED | OUTPATIENT
Start: 2025-02-03

## 2025-03-03 NOTE — PROGRESS NOTES
Office Note     Name: Jose Francisco Mohan    : 2005     MRN: [id number]     Chief Complaint  new patient ([facility]) and Weight Loss (Options for weight los, stuck at the same weight)    Subjective     History of Present Illness:  Jose Francisco Mohan is a 19 y.o. female who presents today for initial visit to [facility]. She primarily wants to discuss weight loss options.  Needs labs as well.  She does have some fatigue without any red flag symptoms.  No other major medical history. No other concerns at this time.       Past Medical History:   Past Medical History:   Diagnosis Date    Anxiety     Depression     Obesity     UTI (urinary tract infection)        Past Surgical History:   Past Surgical History:   Procedure Laterality Date    EAR TUBES      LABIAL ADHESION LYSIS      WISDOM TOOTH EXTRACTION         Immunizations:   Immunization History   Administered Date(s) Administered    HPV Bivalent 2014    HPV Quadrivalent 2014    Meningococcal Conjugate 2014    Meningococcal Polysaccharide 2014    Pneumococcal Conjugate 20-Valent (PCV20) 2024    Tdap 2014        Medications:     Current Outpatient Medications:     cephalexin (Keflex) 500 MG capsule, Take 1 capsule by mouth 4 (Four) Times a Day. (Patient not taking: Reported on 2025), Disp: 28 capsule, Rfl: 0    Chlorhexidine Gluconate 4 % solution, Apply 0.6667 Applications topically 1 (One) Time Per Week. (Patient not taking: Reported on 2025), Disp: 236 mL, Rfl: 3    ferrous sulfate 324 (65 Fe) MG tablet delayed-release EC tablet, Take 1 tablet by mouth Daily With Breakfast., Disp: 30 tablet, Rfl: 2    hydrOXYzine (ATARAX) 25 MG tablet, Take 1 tablet by mouth 3 (Three) Times a Day As Needed for Anxiety., Disp: 90 tablet, Rfl: 2    mupirocin (BACTROBAN) 2 % ointment, Apply 1 Application topically to the appropriate area as directed 3 (Three) Times a Day. (Patient not taking: Reported on  "1/22/2025), Disp: 15 g, Rfl: 0    phentermine (Adipex-P) 37.5 MG tablet, Take 1 tablet by mouth Every Morning Before Breakfast., Disp: 90 tablet, Rfl: 0    Allergies:   Allergies   Allergen Reactions    Bactrim [Sulfamethoxazole-Trimethoprim] Rash    Wellbutrin [Bupropion] Dizziness       Family History:   Family History   Problem Relation Age of Onset    Anxiety disorder Mother     No Known Problems Maternal Grandmother     No Known Problems Maternal Grandfather     Other Paternal Grandmother         pulmonary HTN    COPD Paternal Grandfather        Social History:   Social History     Socioeconomic History    Marital status: Single    Number of children: 2   Tobacco Use    Smoking status: Every Day     Types: Electronic Cigarette     Start date: 2020    Smokeless tobacco: Never   Vaping Use    Vaping status: Every Day    Substances: Nicotine    Devices: Disposable   Substance and Sexual Activity    Alcohol use: No    Drug use: No    Sexual activity: Yes     Partners: Male     Birth control/protection: I.U.D.         Objective     Vital Signs  /68 (BP Location: Left arm, Patient Position: Sitting, Cuff Size: Adult)   Pulse 102   Ht 158.4 cm (62.36\")   Wt 107 kg (234 lb 14.4 oz)   SpO2 98%   BMI 42.47 kg/m²   Estimated body mass index is 42.47 kg/m² as calculated from the following:    Height as of this encounter: 158.4 cm (62.36\").    Weight as of this encounter: 107 kg (234 lb 14.4 oz).            Physical Exam  Constitutional:       General: She is not in acute distress.     Appearance: She is not toxic-appearing.   Cardiovascular:      Rate and Rhythm: Normal rate and regular rhythm.      Heart sounds: No murmur heard.     No friction rub. No gallop.   Pulmonary:      Effort: Pulmonary effort is normal.      Breath sounds: Normal breath sounds.   Abdominal:      General: Abdomen is flat. There is no distension.   Skin:     General: Skin is warm and dry.   Neurological:      Mental Status: She is " alert.   Psychiatric:         Mood and Affect: Mood normal.         Behavior: Behavior normal.          Assessment and Plan     1. Hyperglycemia  A1c normal today  - POC Glycosylated Hemoglobin (Hb A1C)    2. Other fatigue  Chronic stable  Check Labs  No other red flags  - Comprehensive Metabolic Panel; Future  - CBC Auto Differential; Future  - TSH Rfx On Abnormal To Free T4; Future  - Ferritin; Future  - Iron Profile; Future    3. Vitamin D deficiency  Check labs today, chronic stable  - Vitamin D,25-Hydroxy; Future    4. Morbid (severe) obesity due to excess calories  Will Rx Zepbound, if not approved will do phentermine  Has tried dietary interventions, consider weight loss clinic referral in future    5. Generalized anxiety disorder  Chronic stable continue hydroxyzine       Counseling was given to patient for the following topics: instructions for management.    Follow Up  Return in about 3 months (around 4/22/2025) for Annual physical.    Devaughn Hardin MD  [facility]  [address] [address]  [phone]

## 2025-04-30 ENCOUNTER — TELEPHONE (OUTPATIENT)
Age: 20
End: 2025-04-30
Payer: COMMERCIAL

## 2025-05-13 ENCOUNTER — TELEPHONE (OUTPATIENT)
Age: 20
End: 2025-05-13
Payer: COMMERCIAL

## 2025-05-23 ENCOUNTER — OFFICE VISIT (OUTPATIENT)
Age: 20
End: 2025-05-23
Payer: COMMERCIAL

## 2025-05-23 VITALS
SYSTOLIC BLOOD PRESSURE: 106 MMHG | WEIGHT: 231.8 LBS | HEART RATE: 103 BPM | DIASTOLIC BLOOD PRESSURE: 62 MMHG | BODY MASS INDEX: 42.65 KG/M2 | HEIGHT: 62 IN | OXYGEN SATURATION: 99 %

## 2025-05-23 DIAGNOSIS — E66.01 MORBID (SEVERE) OBESITY DUE TO EXCESS CALORIES: ICD-10-CM

## 2025-05-23 DIAGNOSIS — F33.1 MODERATE EPISODE OF RECURRENT MAJOR DEPRESSIVE DISORDER: ICD-10-CM

## 2025-05-23 DIAGNOSIS — R53.83 OTHER FATIGUE: ICD-10-CM

## 2025-05-23 DIAGNOSIS — Z23 IMMUNIZATION DUE: Primary | ICD-10-CM

## 2025-05-23 DIAGNOSIS — E55.9 VITAMIN D DEFICIENCY: ICD-10-CM

## 2025-05-23 RX ORDER — DESVENLAFAXINE 25 MG/1
25 TABLET, EXTENDED RELEASE ORAL DAILY
Qty: 30 TABLET | Refills: 3 | Status: SHIPPED | OUTPATIENT
Start: 2025-05-23

## 2025-05-23 RX ORDER — SEMAGLUTIDE 0.25 MG/.5ML
0.25 INJECTION, SOLUTION SUBCUTANEOUS WEEKLY
Qty: 4 ML | Refills: 0 | COMMUNITY
Start: 2025-05-23

## 2025-05-23 NOTE — PROGRESS NOTES
Office Note     Name: Jose Francisco Mohan    : 2005     MRN: [id number]     Chief Complaint  Obesity (Follow up) and Anxiety (Follow up)    Subjective     History of Present Illness:  Jose Francisco Mohan is a 20 y.o. female who presents today for follow up of chronic medical conditions. She has been struggling with depression and anxiety and has not started phentermine due to concerns about side effects. She is interested in talking to bariatric surgery to see her options for weight loss.      Past Medical History:   Past Medical History:   Diagnosis Date    Anxiety     Depression     Obesity     UTI (urinary tract infection)        Past Surgical History:   Past Surgical History:   Procedure Laterality Date    EAR TUBES      LABIAL ADHESION LYSIS      WISDOM TOOTH EXTRACTION         Immunizations:   Immunization History   Administered Date(s) Administered    HPV Bivalent 2014    HPV Quadrivalent 2014    Meningococcal Conjugate 2014    Meningococcal Polysaccharide 2014    Pneumococcal Conjugate 20-Valent (PCV20) 2024    Tdap 2014        Medications:     Current Outpatient Medications:     ferrous sulfate 324 (65 Fe) MG tablet delayed-release EC tablet, Take 1 tablet by mouth Daily With Breakfast., Disp: 30 tablet, Rfl: 2    cephalexin (Keflex) 500 MG capsule, Take 1 capsule by mouth 4 (Four) Times a Day. (Patient not taking: Reported on 2024), Disp: 28 capsule, Rfl: 0    Chlorhexidine Gluconate 4 % solution, Apply 0.6667 Applications topically 1 (One) Time Per Week. (Patient not taking: Reported on 2024), Disp: 236 mL, Rfl: 3    Desvenlafaxine Succinate ER 25 MG tablet sustained-release 24 hour, Take 1 tablet by mouth Daily., Disp: 30 tablet, Rfl: 3    hydrOXYzine (ATARAX) 25 MG tablet, Take 1 tablet by mouth 3 (Three) Times a Day As Needed for Anxiety. (Patient not taking: Reported on 2025), Disp: 90 tablet, Rfl: 2    mupirocin (BACTROBAN)  "2 % ointment, Apply 1 Application topically to the appropriate area as directed 3 (Three) Times a Day. (Patient not taking: Reported on 12/12/2024), Disp: 15 g, Rfl: 0    phentermine (Adipex-P) 37.5 MG tablet, Take 1 tablet by mouth Every Morning Before Breakfast. (Patient not taking: Reported on 5/23/2025), Disp: 90 tablet, Rfl: 0    Semaglutide-Weight Management (Wegovy) 0.25 MG/0.5ML solution auto-injector, Inject 0.5 mL under the skin into the appropriate area as directed 1 (One) Time Per Week., Disp: 4 mL, Rfl: 0    Allergies:   Allergies   Allergen Reactions    Bactrim [Sulfamethoxazole-Trimethoprim] Rash    Wellbutrin [Bupropion] Dizziness       Family History:   Family History   Problem Relation Age of Onset    Anxiety disorder Mother     No Known Problems Maternal Grandmother     No Known Problems Maternal Grandfather     Other Paternal Grandmother         pulmonary HTN    COPD Paternal Grandfather        Social History:   Social History     Socioeconomic History    Marital status: Single    Number of children: 2   Tobacco Use    Smoking status: Every Day     Types: Electronic Cigarette     Start date: 2020    Smokeless tobacco: Never   Vaping Use    Vaping status: Every Day    Substances: Nicotine    Devices: Disposable   Substance and Sexual Activity    Alcohol use: No    Drug use: No    Sexual activity: Yes     Partners: Male     Birth control/protection: I.U.D.         Objective     Vital Signs  /62 (BP Location: Right arm, Patient Position: Sitting, Cuff Size: Large Adult)   Pulse 103   Ht 158.4 cm (62.36\")   Wt 105 kg (231 lb 12.8 oz)   SpO2 99%   BMI 41.91 kg/m²   Estimated body mass index is 41.91 kg/m² as calculated from the following:    Height as of this encounter: 158.4 cm (62.36\").    Weight as of this encounter: 105 kg (231 lb 12.8 oz).            Physical Exam  Constitutional:       General: She is not in acute distress.     Appearance: She is not toxic-appearing.   Pulmonary:      " Effort: Pulmonary effort is normal. No respiratory distress.   Neurological:      Mental Status: She is alert.   Psychiatric:         Mood and Affect: Mood normal.         Behavior: Behavior normal.          Assessment and Plan     1. Immunization due  Counseled on HPV and tdap    2. Vitamin D deficiency  Check vit d  - Vitamin D,25-Hydroxy; Future    3. Other fatigue  Check iron studies  - Ferritin; Future  - Iron Profile; Future    4. Moderate episode of recurrent major depressive disorder  Chronic uncontrolled  Failed zoloft, wellbutrin, genesight completed in past showing lexapro, viibryd,   Rx pristiq  - Desvenlafaxine Succinate ER 25 MG tablet sustained-release 24 hour; Take 1 tablet by mouth Daily.  Dispense: 30 tablet; Refill: 3    5. Morbid (severe) obesity due to excess calories  Chronic uncontrolled  Sample wegovy, refer to bariatric surgery   - Semaglutide-Weight Management (Wegovy) 0.25 MG/0.5ML solution auto-injector; Inject 0.5 mL under the skin into the appropriate area as directed 1 (One) Time Per Week.  Dispense: 4 mL; Refill: 0       Counseling was given to patient for the following topics: instructions for management.    Follow Up  Return in about 3 months (around 8/23/2025) for Annual physical.    Devaughn Hardin MD  [facility]  [address] [address] [phone]

## 2025-05-23 NOTE — LETTER
[facility]  Vaccine Consent Form    Patient Name:  Jose Francisco Mohan  Patient :  2005     Vaccine(s) Ordered    Tdap Vaccine => 6yo IM (BOOSTRIX/ADACEL)  HPV Vaccine (HPV9)        Screening Checklist  The following questions should be completed prior to vaccination. If you answer “yes” to any question, it does not necessarily mean you should not be vaccinated. It just means we may need to clarify or ask more questions. If a question is unclear, please ask your healthcare provider to explain it.    Yes No   Any fever or moderate to severe illness today (mild illness and/or antibiotic treatment are not contraindications)?     Do you have a history of a serious reaction to any previous vaccinations, such as anaphylaxis, encephalopathy within 7 days, Guillain-[place] syndrome within 6 weeks, seizure?     Have you received any live vaccine(s) (e.g MMR, BEN) or any other vaccines in the last month (to ensure duplicate doses aren't given)?     Do you have an anaphylactic allergy to latex (DTaP, DTaP-IPV, Hep A, Hep B, MenB, RV, Td, Tdap), baker’s yeast (Hep B, HPV), polysorbates (RSV, nirsevimab, PCV 20, Rotavirrus, Tdap, Shingrix), or gelatin (BEN, MMR)?     Do you have an anaphylactic allergy to neomycin (Rabies, BEN, MMR, IPV, Hep A), polymyxin B (IPV), or streptomycin (IPV)?      Any cancer, leukemia, AIDS, or other immune system disorder? (BEN, MMR, RV)     Do you have a parent, brother, or sister with an immune system problem (if immune competence of vaccine recipient clinically verified, can proceed)? (MMR, BEN)     Any recent steroid treatments for >2 weeks, chemotherapy, or radiation treatment? (BEN, MMR)     Have you received antibody-containing blood transfusions or IVIG in the past 11 months (recommended interval is dependent on product)? (MMR, BEN)     Have you taken antiviral drugs (acyclovir, famciclovir, valacyclovir for BEN) in the last 24 or 48 hours, respectively?      Are you pregnant  "or planning to become pregnant within 1 month? (BEN, MMR, HPV, IPV, MenB, Abrexvy; For Hep B- refer to Engerix-B; For RSV - Abrysvo is indicated for 32-36 weeks of pregnancy from September to January)     For infants, have you ever been told your child has had intussusception or a medical emergency involving obstruction of the intestine (Rotavirus)? If not for an infant, can skip this question.         *Ordering Physicians/APC should be consulted if \"yes\" is checked by the patient or guardian above.  I have received, read, and understand the Vaccine Information Statement (VIS) for each vaccine ordered.  I have considered my or my child's health status as well as the health status of my close contacts.  I have taken the opportunity to discuss my vaccine questions with my or my child's health care provider.   I have requested that the ordered vaccine(s) be given to me or my child.  I understand the benefits and risks of the vaccines.  I understand that I should remain in the clinic for 15 minutes after receiving the vaccine(s).  _________________________________________________________  Signature of Patient or Parent/Legal Guardian ____________________  Date     "

## 2025-05-29 ENCOUNTER — PATIENT MESSAGE (OUTPATIENT)
Age: 20
End: 2025-05-29
Payer: COMMERCIAL

## 2025-07-14 ENCOUNTER — PATIENT MESSAGE (OUTPATIENT)
Age: 20
End: 2025-07-14
Payer: COMMERCIAL

## 2025-07-14 DIAGNOSIS — F41.1 GENERALIZED ANXIETY DISORDER: ICD-10-CM

## 2025-07-14 DIAGNOSIS — E66.01 MORBID (SEVERE) OBESITY DUE TO EXCESS CALORIES: Primary | ICD-10-CM
